# Patient Record
Sex: FEMALE | Race: WHITE | NOT HISPANIC OR LATINO | Employment: OTHER | URBAN - METROPOLITAN AREA
[De-identification: names, ages, dates, MRNs, and addresses within clinical notes are randomized per-mention and may not be internally consistent; named-entity substitution may affect disease eponyms.]

---

## 2017-08-08 ENCOUNTER — HOSPITAL ENCOUNTER (EMERGENCY)
Facility: MEDICAL CENTER | Age: 27
End: 2017-08-08
Attending: EMERGENCY MEDICINE
Payer: MEDICAID

## 2017-08-08 VITALS
HEIGHT: 64 IN | RESPIRATION RATE: 16 BRPM | TEMPERATURE: 98.4 F | SYSTOLIC BLOOD PRESSURE: 133 MMHG | DIASTOLIC BLOOD PRESSURE: 82 MMHG | BODY MASS INDEX: 35.49 KG/M2 | HEART RATE: 79 BPM | OXYGEN SATURATION: 98 % | WEIGHT: 207.89 LBS

## 2017-08-08 DIAGNOSIS — S61.319A NAILBED LACERATION, FINGER, INITIAL ENCOUNTER: ICD-10-CM

## 2017-08-08 DIAGNOSIS — S61.219A FINGER LACERATION, INITIAL ENCOUNTER: ICD-10-CM

## 2017-08-08 PROCEDURE — 90715 TDAP VACCINE 7 YRS/> IM: CPT | Performed by: EMERGENCY MEDICINE

## 2017-08-08 PROCEDURE — 303353 HCHG DERMABOND SKIN ADHESIVE

## 2017-08-08 PROCEDURE — 700111 HCHG RX REV CODE 636 W/ 250 OVERRIDE (IP): Performed by: EMERGENCY MEDICINE

## 2017-08-08 PROCEDURE — 99283 EMERGENCY DEPT VISIT LOW MDM: CPT

## 2017-08-08 PROCEDURE — 90471 IMMUNIZATION ADMIN: CPT

## 2017-08-08 PROCEDURE — 304999 HCHG REPAIR-SIMPLE/INTERMED LEVEL 1

## 2017-08-08 RX ADMIN — CLOSTRIDIUM TETANI TOXOID ANTIGEN (FORMALDEHYDE INACTIVATED), CORYNEBACTERIUM DIPHTHERIAE TOXOID ANTIGEN (FORMALDEHYDE INACTIVATED), BORDETELLA PERTUSSIS TOXOID ANTIGEN (GLUTARALDEHYDE INACTIVATED), BORDETELLA PERTUSSIS FILAMENTOUS HEMAGGLUTININ ANTIGEN (FORMALDEHYDE INACTIVATED), BORDETELLA PERTUSSIS PERTACTIN ANTIGEN, AND BORDETELLA PERTUSSIS FIMBRIAE 2/3 ANTIGEN 0.5 ML: 5; 2; 2.5; 5; 3; 5 INJECTION, SUSPENSION INTRAMUSCULAR at 14:07

## 2017-08-08 ASSESSMENT — PAIN SCALES - GENERAL: PAINLEVEL_OUTOF10: 2

## 2017-08-08 NOTE — ED NOTES
Pt bib family after laceration left thumb. Pt accidentally cut left thumb while making a salad. Unsure of last tetanus vaccination

## 2017-08-08 NOTE — ED AVS SNAPSHOT
Dealo Access Code: [email or web address]  Expires: 9/7/2017  2:14 PM    Your email address is not on file at [facility].  Email Addresses are required for you to sign up for Dealo, please contact [phone] to verify your personal information and to provide your email address prior to attempting to register for Dealo.    Sari Marion  [address] [place] [address]    Dealo  A secure, online tool to manage your health information     [facility]’s Dealo® is a secure, online tool that connects you to your personalized health information from the privacy of your home -- day or night - making it very easy for you to manage your healthcare. Once the activation process is completed, you can even access your medical information using the Dealo shea, which is available for free in the Apple Shea store or Google Play store.     To learn more about Dealo, visit [email or web address]    There are two levels of access available (as shown below):   My Chart Features  [facility] Primary Care Doctor [facility]  Specialists [facility]  Urgent  Care Non-[facility] Primary Care Doctor   Email your healthcare team securely and privately 24/7 X X X    Manage appointments: schedule your next appointment; view details of past/upcoming appointments X      Request prescription refills. X      View recent personal medical records, including lab and immunizations X X X X   View health record, including health history, allergies, medications X X X X   Read reports about your outpatient visits, procedures, consult and ER notes X X X X   See your discharge summary, which is a recap of your hospital and/or ER visit that includes your diagnosis, lab results, and care plan X X  X     How to register for The App3t:  Once your e-mail address has been verified, follow the following steps to sign up for Dealo.     1. Go to  [email or web address]  2. Click on the Sign Up Now box, which takes you to the New Member Sign Up page. You will need  to provide the following information:  a. Enter your [facility] Access Code exactly as it appears at the top of this page. (You will not need to use this code after you’ve completed the sign-up process. If you do not sign up before the expiration date, you must request a new code.)   b. Enter your date of birth.   c. Enter your home email address.   d. Click Submit, and follow the next screen’s instructions.  3. Create a [facility] ID. This will be your [facility] login ID and cannot be changed, so think of one that is secure and easy to remember.  4. Create a [facility] password. You can change your password at any time.  5. Enter your Password Reset Question and Answer. This can be used at a later time if you forget your password.   6. Enter your e-mail address. This allows you to receive e-mail notifications when new information is available in [facility].  7. Click Sign Up. You can now view your health information.    For assistance activating your [facility] account, call [phone]

## 2017-08-08 NOTE — ED AVS SNAPSHOT
8/8/2017    Sari Marion  [address] [place] [address]    Dear Sari:    [facility] wants to ensure your discharge home is safe and you or your loved ones have had all of your questions answered regarding your care after you leave the hospital.    Below is a list of resources and contact information should you have any questions regarding your hospital stay, follow-up instructions, or active medical symptoms.    Questions or Concerns Regarding… Contact   Medical Questions Related to Your Discharge  (7 days a week, 8am-5pm) Contact a Nurse Care Coordinator   [phone]   Medical Questions Not Related to Your Discharge  (24 hours a day / 7 days a week)  Contact the Nurse Health Line   [phone]    Medications or Discharge Instructions Refer to your discharge packet   or contact your [facility] Primary Care Provider   [phone]   Follow-up Appointment(s) Schedule your appointment via [employer]   or contact Scheduling [phone]   Billing Review your statement via [employer] Software  or contact Billing [phone]   Medical Records Review your records via [employer] Software   or contact Medical Records [phone]     You may receive a telephone call within two days of discharge. This call is to make certain you understand your discharge instructions and have the opportunity to have any questions answered. You can also easily access your medical information, test results and upcoming appointments via the [employer] Software free online health management tool. You can learn more and sign up at [employer]. For assistance setting up your [employer] Software account, please call [phone].    Once again, we want to ensure your discharge home is safe and that you have a clear understanding of any next steps in your care. If you have any questions or concerns, please do not hesitate to contact us, we are here for you. Thank you for choosing [facility] for your healthcare needs.    Sincerely,    Your [facility] Healthcare Team

## 2017-08-08 NOTE — ED PROVIDER NOTES
"ED Provider Note    CHIEF COMPLAINT  Laceration    HPI  Sari Marion is a 27 y.o. female who presents with complaint of a laceration on the left thumb which occurred just prior to arrival.The patient sustained this injury by  Cutting it with a knife while making a salad. Tetanus vaccination status reviewed and not up to date    REVIEW OF SYSTEMS  See HPI for further details. All other systems are negative.     PAST MEDICAL HISTORY   None    SOCIAL HISTORY  Social History     Social History Main Topics   • Smoking status: Never Smoker    • Smokeless tobacco: Not on file   • Alcohol Use: No   • Drug Use: No   • Sexual Activity: Not on file       SURGICAL HISTORY  patient denies any surgical history    CURRENT MEDICATIONS  Reviewed.  See Encounter Summary.  Include   Current facility-administered medications:   •  tetanus-dipth-acell pertussis (ADACEL) inj 0.5 mL, 0.5 mL, Intramuscular, Once PRN, Georgie Fofana M.D.    Current outpatient prescriptions:   •  trazodone (DESYREL) 150 MG TABS, Take 150 mg by mouth every evening., Disp: , Rfl:   •  promethazine-codeine (PHENERGAN-CODEINE) 6.25-10 MG/5ML SYRP, Take 5 mL by mouth 4 times a day as needed for Cough., Disp: 120 mL, Rfl: 0  •  azithromycin (ZITHROMAX) 250 MG TABS, Use ESVIN as directed, Disp: 6 Tab, Rfl: 0  •  fluoxetine (PROZAC) 20 MG CAPS, Take 1 Cap by mouth every day., Disp: 30 Cap, Rfl: 2  •  alprazolam (XANAX) 1 MG TABS, Take 1 mg by mouth at bedtime as needed., Disp: , Rfl:       ALLERGIES  Allergies   Allergen Reactions   • Nkda [No Known Drug Allergy]        PHYSICAL EXAM  VITAL SIGNS: /82 mmHg  Pulse 79  Temp(Src) 36.9 °C (98.4 °F)  Resp 16  Ht 1.626 m (5' 4\")  Wt 94.3 kg (207 lb 14.3 oz)  BMI 35.67 kg/m2  SpO2 98%  Constitutional: Alert in no apparent distress.  HENT: Normocephalic, Atraumatic, Bilateral external ears normal. Nose normal.   Eyes: Pupils are equal and reactive. Conjunctiva normal, non-icteric.   Thorax & Lungs: Easy " unlabored respirations  Abdomen:  No gross signs of peritonitis no pain with movement   Skin: laceration to the left thumb throough lateral nail on to medial side of distal nail  Extremities:  Neurovascular and tendon structures are intact.  Neurologic: Alert, Grossly non-focal.   Psychiatric: Affect and Mood normal      COURSE & MEDICAL DECISION MAKING  Pertinent Labs & Imaging studies reviewed. (See chart for details)    The underlying bone is unlikely broken because of the mechanism  The wound is not contaminated and the risk of infection is low    Laceration Repair Procedure Note    Indication: Laceration    Procedure: The patient was placed in the appropriate position and anesthesia around the laceration was not performed at the patient's request. The area was then cleansed with Shur-Clens and draped in a sterile fashionThe laceration was closed with Dermabond. There were no additional lacerations requiring repair. The wound area was then dressed with finger spint.      Total repaired wound length: 2 cm.     Other Items: None    The patient tolerated the procedure well.    Complications: None  s. The patient needs to return immediately if there is any redness pus or drainage or signs of infection otherwise they should follow the wound care information sheet     FINAL IMPRESSION  1. Finger and nail Laceration, 2 cm, status post suture repair     The patient was discharged home with an information sheet on laceration care and told to return immediately for any signs or symptoms listed, but specifically if any redness, drainage, pus or wound opening.  The follow-up plan is documented in EPIC and provided in writing to the patient.    Electronically signed by: Georgie Fofana, 8/8/2017 1:46 PM

## 2017-08-08 NOTE — DISCHARGE INSTRUCTIONS
Stitches, Staples, or Adhesive Wound Closure  Doctors use stitches (sutures), staples, and certain glue (skin adhesives) to hold your skin together while it heals (wound closure). You may need this treatment after you have surgery or if you cut your skin accidentally. These methods help your skin heal more quickly. They also make it less likely that you will have a scar.  WHAT ARE THE DIFFERENT KINDS OF WOUND CLOSURES?  There are many options for wound closure. The one that your doctor uses depends on how deep and large your wound is.  Adhesive Glue  To use this glue to close a wound, your doctor holds the edges of the wound together and paints the glue on the surface of your skin. You may need more than one layer of glue. Then the wound may be covered with a light bandage (dressing).  This type of skin closure may be used for small wounds that are not deep (superficial). Using glue for wound closure is less painful than other methods. It does not require a medicine that numbs the area. This method also leaves nothing to be removed. Adhesive glue is often used for children and on facial wounds.  Adhesive glue cannot be used for wounds that are deep, uneven, or bleeding. It is not used inside of a wound.   Adhesive Strips  These strips are made of sticky (adhesive), porous paper. They are placed across your skin edges like a regular adhesive bandage. You leave them on until they fall off.  Adhesive strips may be used to close very superficial wounds. They may also be used along with sutures to improve closure of your skin edges.   Sutures  Sutures are the oldest method of wound closure. Sutures can be made from natural or synthetic materials. They can be made from a material that your body can break down as your wound heals (absorbable), or they can be made from a material that needs to be removed from your skin (nonabsorbable). They come in many different strengths and sizes.  Your doctor attaches the sutures to a  steel needle on one end. Sutures can be passed through your skin, or through the tissues beneath your skin. Then they are tied and cut. Your skin edges may be closed in one continuous stitch or in separate stitches.  Sutures are strong and can be used for all kinds of wounds. Absorbable sutures may be used to close tissues under the skin. The disadvantage of sutures is that they may cause skin reactions that lead to infection. Nonabsorbable sutures need to be removed.  Staples  When surgical staples are used to close a wound, the edges of your skin on both sides of the wound are brought close together. A staple is placed across the wound, and an instrument secures the edges together. Staples are often used to close surgical cuts (incisions).  Staples are faster to use than sutures, and they cause less reaction from your skin. Staples need to be removed using a tool that bends the staples away from your skin.  HOW DO I CARE FOR MY WOUND CLOSURE?  · Take medicines only as told by your doctor.  · If you were prescribed an antibiotic medicine for your wound, finish it all even if you start to feel better.  · Use ointments or creams only as told by your doctor.  · Wash your hands with soap and water before and after touching your wound.  · Do not soak your wound in water. Do not take baths, swim, or use a hot tub until your doctor says it is okay.  · Ask your doctor when you can start showering. Cover your wound if told by your doctor.  · Do not take out your own sutures or staples.  · Do not pick at your wound. Picking can cause an infection.  · Keep all follow-up visits as told by your doctor. This is important.  HOW LONG WILL I HAVE MY WOUND CLOSURE?   · Leave adhesive glue on your skin until the glue peels away.  · Leave adhesive strips on your skin until they fall off.  · Absorbable sutures will dissolve within several days.  · Nonabsorbable sutures and staples must be removed. The location of the wound will  determine how long they stay in. This can range from several days to a couple of weeks.  WHEN SHOULD I SEEK HELP FOR MY WOUND CLOSURE?  Contact your doctor if:  · You have a fever.  · You have chills.  · You have redness, puffiness (swelling), or pain at the site of your wound.  · You have fluid, blood, or pus coming from your wound.  · There is a bad smell coming from your wound.  · The skin edges of your wound start to separate after your sutures have been removed.  · Your wound becomes thick, raised, and darker in color after your sutures come out (scarring).     This information is not intended to replace advice given to you by your health care provider. Make sure you discuss any questions you have with your health care provider.     Document Released: 10/15/2010 Document Revised: 01/08/2016 Document Reviewed: 05/27/2015  Elsevier Interactive Patient Education ©2016 [employer]

## 2017-08-08 NOTE — ED AVS SNAPSHOT
Home Care Instructions                                                                                                                Sari Marion   MRN: [id number]    Department:  [facility], Emergency Dept   Date of Visit:  8/8/2017            [facility], Emergency Dept    [address] [place] [address]    Phone:  [phone]      You were seen by     Georgie Fofana M.D.      Your Diagnosis Was     Finger laceration, initial encounter     S61.219A       These are the medications you received during your hospitalization from 08/08/2017 1319 to 08/08/2017 1414     Date/Time Order Dose Route Action    08/08/2017 1407 tetanus-dipth-acell pertussis (ADACEL) inj 0.5 mL 0.5 mL Intramuscular Given      Follow-up Information     1. Follow up with Pcp Pt States None.    Specialty:  Family Medicine    Why:  return if any signs of infection      Medication Information     Review all of your home medications and newly ordered medications with your primary doctor and/or pharmacist as soon as possible. Follow medication instructions as directed by your doctor and/or pharmacist.     Please keep your complete medication list with you and share with your physician. Update the information when medications are discontinued, doses are changed, or new medications (including over-the-counter products) are added; and carry medication information at all times in the event of emergency situations.               Medication List      ASK your doctor about these medications        Instructions    Morning Afternoon Evening Bedtime    alprazolam 1 MG Tabs   Commonly known as:  XANAX        Take 1 mg by mouth at bedtime as needed.   Dose:  1 mg                        azithromycin 250 MG Tabs   Commonly known as:  ZITHROMAX        Use ESVIN as directed                        fluoxetine 20 MG Caps   Commonly known as:  PROZAC        Take 1 Cap by mouth every day.   Dose:  20 mg                           promethazine-codeine 6.25-10 MG/5ML Syrp   Commonly known as:  PHENERGAN-CODEINE        Take 5 mL by mouth 4 times a day as needed for Cough.   Dose:  5 mL                        trazodone 150 MG Tabs   Commonly known as:  DESYREL        Take 150 mg by mouth every evening.   Dose:  150 mg                                  Discharge Instructions       Stitches, Staples, or Adhesive Wound Closure  Doctors use stitches (sutures), staples, and certain glue (skin adhesives) to hold your skin together while it heals (wound closure). You may need this treatment after you have surgery or if you cut your skin accidentally. These methods help your skin heal more quickly. They also make it less likely that you will have a scar.  WHAT ARE THE DIFFERENT KINDS OF WOUND CLOSURES?  There are many options for wound closure. The one that your doctor uses depends on how deep and large your wound is.  Adhesive Glue  To use this glue to close a wound, your doctor holds the edges of the wound together and paints the glue on the surface of your skin. You may need more than one layer of glue. Then the wound may be covered with a light bandage (dressing).  This type of skin closure may be used for small wounds that are not deep (superficial). Using glue for wound closure is less painful than other methods. It does not require a medicine that numbs the area. This method also leaves nothing to be removed. Adhesive glue is often used for children and on facial wounds.  Adhesive glue cannot be used for wounds that are deep, uneven, or bleeding. It is not used inside of a wound.   Adhesive Strips  These strips are made of sticky (adhesive), porous paper. They are placed across your skin edges like a regular adhesive bandage. You leave them on until they fall off.  Adhesive strips may be used to close very superficial wounds. They may also be used along with sutures to improve closure of your skin edges.   Sutures  Sutures are  the oldest method of wound closure. Sutures can be made from natural or synthetic materials. They can be made from a material that your body can break down as your wound heals (absorbable), or they can be made from a material that needs to be removed from your skin (nonabsorbable). They come in many different strengths and sizes.  Your doctor attaches the sutures to a steel needle on one end. Sutures can be passed through your skin, or through the tissues beneath your skin. Then they are tied and cut. Your skin edges may be closed in one continuous stitch or in separate stitches.  Sutures are strong and can be used for all kinds of wounds. Absorbable sutures may be used to close tissues under the skin. The disadvantage of sutures is that they may cause skin reactions that lead to infection. Nonabsorbable sutures need to be removed.  Staples  When surgical staples are used to close a wound, the edges of your skin on both sides of the wound are brought close together. A staple is placed across the wound, and an instrument secures the edges together. Staples are often used to close surgical cuts (incisions).  Staples are faster to use than sutures, and they cause less reaction from your skin. Staples need to be removed using a tool that bends the staples away from your skin.  HOW DO I CARE FOR MY WOUND CLOSURE?  · Take medicines only as told by your doctor.  · If you were prescribed an antibiotic medicine for your wound, finish it all even if you start to feel better.  · Use ointments or creams only as told by your doctor.  · Wash your hands with soap and water before and after touching your wound.  · Do not soak your wound in water. Do not take baths, swim, or use a hot tub until your doctor says it is okay.  · Ask your doctor when you can start showering. Cover your wound if told by your doctor.  · Do not take out your own sutures or staples.  · Do not pick at your wound. Picking can cause an infection.  · Keep all  follow-up visits as told by your doctor. This is important.  HOW LONG WILL I HAVE MY WOUND CLOSURE?   · Leave adhesive glue on your skin until the glue peels away.  · Leave adhesive strips on your skin until they fall off.  · Absorbable sutures will dissolve within several days.  · Nonabsorbable sutures and staples must be removed. The location of the wound will determine how long they stay in. This can range from several days to a couple of weeks.  WHEN SHOULD I SEEK HELP FOR MY WOUND CLOSURE?  Contact your doctor if:  · You have a fever.  · You have chills.  · You have redness, puffiness (swelling), or pain at the site of your wound.  · You have fluid, blood, or pus coming from your wound.  · There is a bad smell coming from your wound.  · The skin edges of your wound start to separate after your sutures have been removed.  · Your wound becomes thick, raised, and darker in color after your sutures come out (scarring).     This information is not intended to replace advice given to you by your health care provider. Make sure you discuss any questions you have with your health care provider.     Document Released: 10/15/2010 Document Revised: 01/08/2016 Document Reviewed: 05/27/2015  [employer] Interactive Patient Education ©2016 [employer]            Patient Information     Patient Information    Following emergency treatment: all patient requiring follow-up care must return either to a private physician or a clinic if your condition worsens before you are able to obtain further medical attention, please return to the emergency room.     Billing Information    At [facility], we work to make the billing process streamlined for our patients.  Our Representatives are here to answer any questions you may have regarding your hospital bill.  If you have insurance coverage and have supplied your insurance information to us, we will submit a claim to your insurer on your behalf.  Should you have any questions regarding  your bill, we can be reached online or by phone as follows:  Online: You are able pay your bills online or live chat with our representatives about any billing questions you may have. We are here to help Monday - Friday from 8:00am to 7:30pm and 9:00am - 12:00pm on Saturdays.  Please visit [email or web address]  for more information.   Phone:  [phone] or [phone]    Please note that your emergency physician, surgeon, pathologist, radiologist, anesthesiologist, and other specialists are not employed by [facility] and will therefore bill separately for their services.  Please contact them directly for any questions concerning their bills at the numbers below:     Emer[facility] Physician Services:  [phone]  [facility]:  [phone]  [facility]:  [phone]  [facility]:  [phone]    1. Your final bill may vary from the amount quoted upon discharge if all procedures are not complete at that time, or if your doctor has additional procedures of which we are not aware. You will receive an additional bill if you return to the Emergency Department at [facility] for suture removal regardless of the facility of which the sutures were placed.     2. Please arrange for settlement of this account at the emergency registration.    3. All self-pay accounts are due in full at the time of treatment.  If you are unable to meet this obligation then payment is expected within 4-5 days.     4. If you have had radiology studies (CT, X-ray, Ultrasound, MRI), you have received a preliminary result during your emergency department visit. Please contact the radiology department [phone] to receive a copy of your final result. Please discuss the Final result with your primary physician or with the follow up physician provided.     Crisis Hotline:  National Crisis Hotline:  8-565-ETMEPXO or 1-902.195.7708  [place] Crisis Hotline:     4-961-477-6770 or 762-537-0254         ED Discharge Follow Up Questions    1. In order to provide you with very good care, we would like to follow up with a phone call in the next few days.  May we have your permission to contact you?     YES /  NO    2. What is the best phone number to call you? (       )_____-__________    3. What is the best time to call you?      Morning  /  Afternoon  /  Evening                   Patient Signature:  ____________________________________________________________    Date:  ____________________________________________________________

## 2019-03-28 ENCOUNTER — OFFICE VISIT (OUTPATIENT)
Dept: MEDICAL GROUP | Facility: LAB | Age: 29
End: 2019-03-28
Payer: COMMERCIAL

## 2019-03-28 VITALS
TEMPERATURE: 98 F | RESPIRATION RATE: 20 BRPM | OXYGEN SATURATION: 96 % | HEIGHT: 64 IN | WEIGHT: 211 LBS | BODY MASS INDEX: 36.02 KG/M2 | HEART RATE: 77 BPM | SYSTOLIC BLOOD PRESSURE: 118 MMHG | DIASTOLIC BLOOD PRESSURE: 80 MMHG

## 2019-03-28 DIAGNOSIS — R53.83 FATIGUE, UNSPECIFIED TYPE: ICD-10-CM

## 2019-03-28 DIAGNOSIS — G43.109 MIGRAINE WITH AURA AND WITHOUT STATUS MIGRAINOSUS, NOT INTRACTABLE: ICD-10-CM

## 2019-03-28 DIAGNOSIS — Z11.3 ROUTINE SCREENING FOR STI (SEXUALLY TRANSMITTED INFECTION): ICD-10-CM

## 2019-03-28 DIAGNOSIS — F90.0 ATTENTION DEFICIT HYPERACTIVITY DISORDER (ADHD), PREDOMINANTLY INATTENTIVE TYPE: ICD-10-CM

## 2019-03-28 DIAGNOSIS — E66.9 OBESITY (BMI 35.0-39.9 WITHOUT COMORBIDITY): ICD-10-CM

## 2019-03-28 DIAGNOSIS — Z20.828 EXPOSURE TO HERPES SIMPLEX VIRUS (HSV): ICD-10-CM

## 2019-03-28 DIAGNOSIS — F43.10 PTSD (POST-TRAUMATIC STRESS DISORDER): ICD-10-CM

## 2019-03-28 PROBLEM — F90.9 ADHD: Status: ACTIVE | Noted: 2019-03-28

## 2019-03-28 PROCEDURE — 99203 OFFICE O/P NEW LOW 30 MIN: CPT | Performed by: NURSE PRACTITIONER

## 2019-03-28 RX ORDER — ESCITALOPRAM OXALATE 20 MG/1
20 TABLET ORAL
Refills: 2 | COMMUNITY
Start: 2019-03-13 | End: 2021-04-01

## 2019-03-28 RX ORDER — METHYLPHENIDATE HYDROCHLORIDE 20 MG/1
20 TABLET ORAL 2 TIMES DAILY
COMMUNITY
End: 2021-04-01

## 2019-03-28 RX ORDER — ALPRAZOLAM 0.5 MG/1
TABLET ORAL
Refills: 2 | COMMUNITY
Start: 2019-02-11 | End: 2021-04-01

## 2019-03-28 ASSESSMENT — PATIENT HEALTH QUESTIONNAIRE - PHQ9: CLINICAL INTERPRETATION OF PHQ2 SCORE: 0

## 2019-03-28 NOTE — ASSESSMENT & PLAN NOTE
This is a chronic problem for this patient.  She has a therapist who follows her for this.  She is currently controlled with Ritalin 20 mg twice daily.

## 2019-03-28 NOTE — ASSESSMENT & PLAN NOTE
This is a chronic problem for this patient. They began in puberty. They are triggered by poor dietary choices and lack of sleep. They occur 2-3 times monthly. She reports they are sometimes associated with purple dots in her vision. Patient reports frequent headaches located in the frontal region. Describes headaches as moderate in severity  with nausea, phonophobia and photophobia.    Headaches are relieved by acetaminophen, ibuprofen  Preventive treatment currently: None.  She tried propanolol in the past and did not get relief.    Known triggers include: perimenstrual patterns, unhealthy foods, alcohol intake and stress weather changes, poor sleep.   Current stressors include:   work, finance, family. Usually sleeps 5-7 hours per night.     Denies red flag symptoms, including: headache in same location, persistent headache, headache worse with bending over or sneezing, headache waking up during sleep. Also denies difficulty with speech, focal weakness, fever, cough, sinus congestion or teeth grinding.    Family Hx: migraine headaches in father  Prior imaging: yes.  She reports that she has had a head CT in the past with no abnormal findings.

## 2019-03-28 NOTE — ASSESSMENT & PLAN NOTE
Is a chronic problem for this patient.  She admits her diet has not been optimal. Patient and I discussed the importance of lifestyle changes, with particular emphasis on decreasing sugar and carbohydrate intake and increasing plant-based nutrition (for the purposes of weight loss, general health, and prevention of chronic illnesses), as well as regular cardiovascular exercise, proper sleep, and stress management. Patient verbalized understanding.
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
## 2019-04-30 ENCOUNTER — HOSPITAL ENCOUNTER (OUTPATIENT)
Dept: LAB | Facility: MEDICAL CENTER | Age: 29
End: 2019-04-30
Attending: NURSE PRACTITIONER
Payer: COMMERCIAL

## 2019-04-30 ENCOUNTER — HOSPITAL ENCOUNTER (OUTPATIENT)
Dept: LAB | Facility: MEDICAL CENTER | Age: 29
End: 2019-04-30
Attending: PSYCHIATRY & NEUROLOGY
Payer: COMMERCIAL

## 2019-04-30 DIAGNOSIS — E66.9 OBESITY (BMI 35.0-39.9 WITHOUT COMORBIDITY): ICD-10-CM

## 2019-04-30 DIAGNOSIS — Z11.3 ROUTINE SCREENING FOR STI (SEXUALLY TRANSMITTED INFECTION): ICD-10-CM

## 2019-04-30 LAB
ALBUMIN SERPL BCP-MCNC: 4.2 G/DL (ref 3.2–4.9)
ALBUMIN/GLOB SERPL: 1.7 G/DL
ALP SERPL-CCNC: 48 U/L (ref 30–99)
ALT SERPL-CCNC: 22 U/L (ref 2–50)
ANION GAP SERPL CALC-SCNC: 9 MMOL/L (ref 0–11.9)
AST SERPL-CCNC: 15 U/L (ref 12–45)
BASOPHILS # BLD AUTO: 0.7 % (ref 0–1.8)
BASOPHILS # BLD AUTO: 0.8 % (ref 0–1.8)
BASOPHILS # BLD: 0.05 K/UL (ref 0–0.12)
BASOPHILS # BLD: 0.06 K/UL (ref 0–0.12)
BILIRUB SERPL-MCNC: 0.3 MG/DL (ref 0.1–1.5)
BUN SERPL-MCNC: 16 MG/DL (ref 8–22)
C TRACH DNA SPEC QL NAA+PROBE: NEGATIVE
CALCIUM SERPL-MCNC: 9.5 MG/DL (ref 8.5–10.5)
CHLORIDE SERPL-SCNC: 105 MMOL/L (ref 96–112)
CHOLEST SERPL-MCNC: 238 MG/DL (ref 100–199)
CO2 SERPL-SCNC: 26 MMOL/L (ref 20–33)
CREAT SERPL-MCNC: 0.63 MG/DL (ref 0.5–1.4)
EOSINOPHIL # BLD AUTO: 0.08 K/UL (ref 0–0.51)
EOSINOPHIL # BLD AUTO: 0.11 K/UL (ref 0–0.51)
EOSINOPHIL NFR BLD: 1.1 % (ref 0–6.9)
EOSINOPHIL NFR BLD: 1.5 % (ref 0–6.9)
ERYTHROCYTE [DISTWIDTH] IN BLOOD BY AUTOMATED COUNT: 43 FL (ref 35.9–50)
ERYTHROCYTE [DISTWIDTH] IN BLOOD BY AUTOMATED COUNT: 43.4 FL (ref 35.9–50)
FASTING STATUS PATIENT QL REPORTED: NORMAL
GLOBULIN SER CALC-MCNC: 2.5 G/DL (ref 1.9–3.5)
GLUCOSE SERPL-MCNC: 84 MG/DL (ref 65–99)
HCT VFR BLD AUTO: 44.4 % (ref 37–47)
HCT VFR BLD AUTO: 45.4 % (ref 37–47)
HDLC SERPL-MCNC: 50 MG/DL
HGB BLD-MCNC: 13.8 G/DL (ref 12–16)
HGB BLD-MCNC: 14 G/DL (ref 12–16)
IMM GRANULOCYTES # BLD AUTO: 0.02 K/UL (ref 0–0.11)
IMM GRANULOCYTES # BLD AUTO: 0.03 K/UL (ref 0–0.11)
IMM GRANULOCYTES NFR BLD AUTO: 0.3 % (ref 0–0.9)
IMM GRANULOCYTES NFR BLD AUTO: 0.4 % (ref 0–0.9)
LDLC SERPL CALC-MCNC: 170 MG/DL
LYMPHOCYTES # BLD AUTO: 2.6 K/UL (ref 1–4.8)
LYMPHOCYTES # BLD AUTO: 2.63 K/UL (ref 1–4.8)
LYMPHOCYTES NFR BLD: 35 % (ref 22–41)
LYMPHOCYTES NFR BLD: 36.8 % (ref 22–41)
MCH RBC QN AUTO: 27.6 PG (ref 27–33)
MCH RBC QN AUTO: 27.7 PG (ref 27–33)
MCHC RBC AUTO-ENTMCNC: 30.8 G/DL (ref 33.6–35)
MCHC RBC AUTO-ENTMCNC: 31.1 G/DL (ref 33.6–35)
MCV RBC AUTO: 89 FL (ref 81.4–97.8)
MCV RBC AUTO: 89.4 FL (ref 81.4–97.8)
MONOCYTES # BLD AUTO: 0.36 K/UL (ref 0–0.85)
MONOCYTES # BLD AUTO: 0.41 K/UL (ref 0–0.85)
MONOCYTES NFR BLD AUTO: 5 % (ref 0–13.4)
MONOCYTES NFR BLD AUTO: 5.5 % (ref 0–13.4)
N GONORRHOEA DNA SPEC QL NAA+PROBE: NEGATIVE
NEUTROPHILS # BLD AUTO: 3.99 K/UL (ref 2–7.15)
NEUTROPHILS # BLD AUTO: 4.23 K/UL (ref 2–7.15)
NEUTROPHILS NFR BLD: 55.9 % (ref 44–72)
NEUTROPHILS NFR BLD: 57 % (ref 44–72)
NRBC # BLD AUTO: 0 K/UL
NRBC # BLD AUTO: 0 K/UL
NRBC BLD-RTO: 0 /100 WBC
NRBC BLD-RTO: 0 /100 WBC
PLATELET # BLD AUTO: 305 K/UL (ref 164–446)
PLATELET # BLD AUTO: 308 K/UL (ref 164–446)
PMV BLD AUTO: 10.3 FL (ref 9–12.9)
PMV BLD AUTO: 10.3 FL (ref 9–12.9)
POTASSIUM SERPL-SCNC: 4.2 MMOL/L (ref 3.6–5.5)
PROT SERPL-MCNC: 6.7 G/DL (ref 6–8.2)
RBC # BLD AUTO: 4.99 M/UL (ref 4.2–5.4)
RBC # BLD AUTO: 5.08 M/UL (ref 4.2–5.4)
SODIUM SERPL-SCNC: 140 MMOL/L (ref 135–145)
SPECIMEN SOURCE: NORMAL
T4 FREE SERPL-MCNC: 1.03 NG/DL (ref 0.53–1.43)
TRIGL SERPL-MCNC: 90 MG/DL (ref 0–149)
TSH SERPL DL<=0.005 MIU/L-ACNC: 2.64 UIU/ML (ref 0.38–5.33)
WBC # BLD AUTO: 7.2 K/UL (ref 4.8–10.8)
WBC # BLD AUTO: 7.4 K/UL (ref 4.8–10.8)

## 2019-04-30 PROCEDURE — 80061 LIPID PANEL: CPT

## 2019-04-30 PROCEDURE — 85025 COMPLETE CBC W/AUTO DIFF WBC: CPT

## 2019-04-30 PROCEDURE — 84443 ASSAY THYROID STIM HORMONE: CPT

## 2019-04-30 PROCEDURE — 85025 COMPLETE CBC W/AUTO DIFF WBC: CPT | Mod: 91

## 2019-04-30 PROCEDURE — 80307 DRUG TEST PRSMV CHEM ANLYZR: CPT

## 2019-04-30 PROCEDURE — 36415 COLL VENOUS BLD VENIPUNCTURE: CPT

## 2019-04-30 PROCEDURE — 84439 ASSAY OF FREE THYROXINE: CPT

## 2019-04-30 PROCEDURE — 84443 ASSAY THYROID STIM HORMONE: CPT | Mod: 91

## 2019-04-30 PROCEDURE — 87389 HIV-1 AG W/HIV-1&-2 AB AG IA: CPT

## 2019-04-30 PROCEDURE — 86694 HERPES SIMPLEX NES ANTBDY: CPT | Mod: 91

## 2019-04-30 PROCEDURE — 80053 COMPREHEN METABOLIC PANEL: CPT

## 2019-04-30 PROCEDURE — 87491 CHLMYD TRACH DNA AMP PROBE: CPT

## 2019-04-30 PROCEDURE — 86780 TREPONEMA PALLIDUM: CPT

## 2019-04-30 PROCEDURE — 81291 MTHFR GENE: CPT

## 2019-04-30 PROCEDURE — 87591 N.GONORRHOEAE DNA AMP PROB: CPT

## 2019-05-01 LAB
AMPHET CTO UR CFM-MCNC: NEGATIVE NG/ML
BARBITURATES CTO UR CFM-MCNC: NEGATIVE NG/ML
BENZODIAZ CTO UR CFM-MCNC: NEGATIVE NG/ML
CANNABINOIDS CTO UR CFM-MCNC: NEGATIVE NG/ML
COCAINE CTO UR CFM-MCNC: NEGATIVE NG/ML
DRUG COMMENT 753798: NORMAL
HIV 1+2 AB+HIV1 P24 AG SERPL QL IA: NON REACTIVE
METHADONE CTO UR CFM-MCNC: NEGATIVE NG/ML
OPIATES CTO UR CFM-MCNC: NEGATIVE NG/ML
PCP CTO UR CFM-MCNC: NEGATIVE NG/ML
PROPOXYPH CTO UR CFM-MCNC: NEGATIVE NG/ML
TREPONEMA PALLIDUM IGG+IGM AB [PRESENCE] IN SERUM OR PLASMA BY IMMUNOASSAY: NON REACTIVE
TSH SERPL DL<=0.005 MIU/L-ACNC: 2.67 UIU/ML (ref 0.38–5.33)

## 2019-05-02 LAB
HSV1+2 IGG SER IA-ACNC: 1.06 IV
HSV1+2 IGM SER IA-ACNC: 0.3 IV

## 2019-05-06 LAB
MTHFR C.1298A>C GENO BLD/T: NEGATIVE
MTHFR C.677C>T GENO BLD/T: NEGATIVE
MTHFR GENE MUT ANL BLD/T: NORMAL

## 2019-05-30 ENCOUNTER — TELEPHONE (OUTPATIENT)
Dept: MEDICAL GROUP | Facility: LAB | Age: 29
End: 2019-05-30

## 2019-05-30 NOTE — TELEPHONE ENCOUNTER
VOICEMAIL   Wednesday 5/29/19  1. Caller Name: Sari                Call Back Number: [phone] (home)     2. Message: Sari called she said she sign a release of information with her psychiatrist. She is needing her UA results sent to her psychiatrist before she can get her prescription.     I see the release of information is scanned into her chart would you like me to send the results.    3. Patient approves office to leave a detailed voicemail/MyChart message: yes

## 2019-05-30 NOTE — TELEPHONE ENCOUNTER
Phone Number Called: [phone] (home)     Call outcome: spoke to patient regarding message below    Message: I spoke to Sari letting know I am waiting on HIM Department to contact me regarding the release of her lab results.    I spoke to Andrew in HIM and she will send the [facility] records.

## 2019-05-31 ENCOUNTER — HOSPITAL ENCOUNTER (OUTPATIENT)
Facility: MEDICAL CENTER | Age: 29
End: 2019-05-31
Attending: NURSE PRACTITIONER
Payer: COMMERCIAL

## 2019-05-31 ENCOUNTER — OFFICE VISIT (OUTPATIENT)
Dept: MEDICAL GROUP | Facility: LAB | Age: 29
End: 2019-05-31
Payer: COMMERCIAL

## 2019-05-31 VITALS
TEMPERATURE: 98.4 F | RESPIRATION RATE: 14 BRPM | SYSTOLIC BLOOD PRESSURE: 122 MMHG | OXYGEN SATURATION: 94 % | HEIGHT: 64 IN | DIASTOLIC BLOOD PRESSURE: 82 MMHG | HEART RATE: 76 BPM | BODY MASS INDEX: 35.96 KG/M2 | WEIGHT: 210.6 LBS

## 2019-05-31 DIAGNOSIS — Z01.419 WELL WOMAN EXAM WITH ROUTINE GYNECOLOGICAL EXAM: ICD-10-CM

## 2019-05-31 DIAGNOSIS — E78.5 DYSLIPIDEMIA: ICD-10-CM

## 2019-05-31 DIAGNOSIS — Z12.4 SCREENING FOR CERVICAL CANCER: ICD-10-CM

## 2019-05-31 PROCEDURE — 99395 PREV VISIT EST AGE 18-39: CPT | Performed by: NURSE PRACTITIONER

## 2019-05-31 PROCEDURE — 88175 CYTOPATH C/V AUTO FLUID REDO: CPT

## 2019-05-31 RX ORDER — FLUOXETINE 10 MG/1
20 CAPSULE ORAL DAILY
COMMUNITY
End: 2021-07-01

## 2019-05-31 NOTE — PROGRESS NOTES
SUBJECTIVE: Sari Marion is a 29 y.o. No obstetric history on file. female who is her today for annual routine gynecologic exam    Obstetric History       T0      L0     SAB0   TAB0   Ectopic0   Molar0   Multiple0   Live Births0       Last Pap:   History   Sexual Activity   • Sexual activity: Yes   • Birth control/ protection: Condom     Sexual history: currently sexually active   H/O Abnormal Pap no  She  reports that she has been smoking Cigarettes.  She has never used smokeless tobacco.        Allergies: Nkda [no known drug allergy]     ROS:      PREMENOPAUSAL  Menses every month with 5 days moderate bleeding   Cramping is moderate.       No significant bloating/fluid retention, pelvic pain, or dyspareunia. No vaginal discharge   No breast tenderness, mass, nipple discharge, changes in size or contour, or abnormal cyclic discomfort.  No urinary tract symptoms, no incontinence, no polydipsia, polyuria,  No abdominal pain, change in bowel habits, black or bloody stools.    No unusual headaches, no visual changes, menstrual migraines   No prolonged cough. No dyspnea or chest pain on exertion.  No depression, labile mood, anxiety, libido changes, insomnia.  No temperature intolerance.  No new/concerning skin lesions, concerns.     Exercise: moderate regular exercise program    Preventive Care:  Mammogram: Due at 40  Colonoscopy: Due at 50  Tetanus booster: Last done 2017      Current medicines (including changes today)  Current Outpatient Prescriptions   Medication Sig Dispense Refill   • FLUoxetine (PROZAC) 10 MG Cap Take 10 mg by mouth every day.     • methylphenidate (RITALIN) 20 MG tablet Take 20 mg by mouth 2 times a day.     • ALPRAZolam (XANAX) 0.5 MG Tab TAKE 1 TAB BY MOUTH TWICE DAILY AND 1 ADDITIONAL TAB DAILY AS NEEDED F43.10  2   • escitalopram (LEXAPRO) 20 MG tablet Take 20 mg by mouth.  2   • trazodone (DESYREL) 150 MG TABS Take 150 mg by mouth every evening.       No current  "facility-administered medications for this visit.      She  has a past medical history of Allergy; Anxiety; Asthma; Depression; and Migraine. She also has no past medical history of ASTHMA or Diabetes.  She  has no past surgical history on file.     Family History:   Family History   Problem Relation Age of Onset   • Arthritis Mother    • Psychiatry Mother    • Hyperlipidemia Mother    • Alcohol/Drug Father    • Psychiatry Father    • Hyperlipidemia Paternal Grandfather    • Hypertension Paternal Grandfather    • Psychiatry Paternal Grandfather        OBJECTIVE:   /82 (BP Location: Right arm, Patient Position: Sitting, BP Cuff Size: Adult long)   Pulse 76   Temp 36.9 °C (98.4 °F) (Temporal)   Resp 14   Ht 1.626 m (5' 4\")   Wt 95.5 kg (210 lb 9.6 oz)   LMP 05/21/2019 (Approximate)   SpO2 94%   Breastfeeding? No   BMI 36.15 kg/m²   Body mass index is 36.15 kg/m².    HEENT: NC/AT, MMM, oropharynx clear  NECK:  No cervical or supraclavicular EMMANUEL  NEURO: Cranial nerves II-XII grossly intact  CARDIOVASCULAR:  Regular rate and rhythm.  S1 and S2 normal.  No edema  LUNGS: CTAB  ABDOMEN:  Soft without tenderness, guarding, mass or organomegaly.  No CVA tenderness or inguinal adenopathy.   EXTREMITIES:  peripheral pulses are 2+.   SKIN: color normal, vascularity normal, temperature normal. No rashes or suspicious skin lesions noted.  BREAST: Performed with instruction during examination. No axillary lymphadenopathy, no skin changes, no dominant masses. No nipple retraction  Pelvic Exam -  Normal external genitalia with no lesions. Normal vaginal mucosa with normal rugation and scant discharge. Cervix with no visible lesions. No cervical motion tenderness. Uterus is normal sized with no masses. No adnexal tenderness or enlargement appreciated. Thin Prep Pap is obtained, vaginal swab is not obtained and specimen(s) sent to lab    ASSESSMENT and PLAN  1. Well woman exam with routine gynecological exam  Healthy " well woman exam.  No complaints.  - THINPREP PAP,REFLEX HPV ON ASC-US ONLY; Future    2. Screening for cervical cancer  - THINPREP PAP,REFLEX HPV ON ASC-US ONLY; Future    3. Dyslipidemia  Patient will repeat lipid profile in November.  She is working on lifestyle changes including increased exercise.  She bought a fit bit and is tracking her steps as well as her food.  She is working hard on losing weight.  - Lipid Profile; Future    Pap and physical exam performed  STI screening declined  Monthly SBE  Counseling: breast self exam, STD prevention, HIV risk factors and prevention, family planning choices and adequate intake of calcium and vitamin D  Encourage exercise and proper diet.  Mammograms starting @ age 40 annually.         Discussed  breast self exam, STD prevention, diet and exercise   Follow-up in 3 years for next Pap if results are normal.   Next office visit for recheck of chronic medical conditions is due in 6 months      Please note that this dictation was created using voice recognition software. I have made every reasonable attempt to correct obvious errors, but I expect that there are errors of grammar and possibly content that I did not discover before finalizing the note.

## 2019-06-01 LAB — CYTOLOGY REG CYTOL: NORMAL

## 2019-06-19 ENCOUNTER — OFFICE VISIT (OUTPATIENT)
Dept: MEDICAL GROUP | Facility: LAB | Age: 29
End: 2019-06-19
Payer: COMMERCIAL

## 2019-06-19 VITALS
TEMPERATURE: 98.4 F | HEIGHT: 64 IN | DIASTOLIC BLOOD PRESSURE: 62 MMHG | SYSTOLIC BLOOD PRESSURE: 100 MMHG | OXYGEN SATURATION: 97 % | HEART RATE: 65 BPM | BODY MASS INDEX: 35.51 KG/M2 | RESPIRATION RATE: 12 BRPM | WEIGHT: 208 LBS

## 2019-06-19 DIAGNOSIS — J06.9 UPPER RESPIRATORY TRACT INFECTION, UNSPECIFIED TYPE: ICD-10-CM

## 2019-06-19 PROCEDURE — 99213 OFFICE O/P EST LOW 20 MIN: CPT | Performed by: INTERNAL MEDICINE

## 2019-06-19 RX ORDER — AZITHROMYCIN 250 MG/1
TABLET, FILM COATED ORAL
Qty: 6 TAB | Refills: 0 | Status: SHIPPED | OUTPATIENT
Start: 2019-06-19 | End: 2019-11-27

## 2019-06-21 ENCOUNTER — TELEPHONE (OUTPATIENT)
Dept: MEDICAL GROUP | Facility: LAB | Age: 29
End: 2019-06-21

## 2019-06-21 NOTE — TELEPHONE ENCOUNTER
I spoke to Sari, she said that she thinks that the blood came from a tonsil that is really irritated from her cough.  It is not bleeding today and she is feeling a lot better.

## 2019-06-21 NOTE — TELEPHONE ENCOUNTER
Mirta:  Very important, any coughing or vomiting of blood needs to be evaluated in emergency room setting.  Ambulance if necessary or police for a welfare check if necessary.  Regards, Davie Smith, DO

## 2019-06-21 NOTE — TELEPHONE ENCOUNTER
1. Caller Name: Sari                                          Call Back Number: [phone] (home)        Patient approves a detailed voicemail message: yes  Pt called stating that she is now coughing up blood.  I called her back to get her an appt with our nurse.  I went straight to voicemail so I left a message for call back

## 2019-06-22 ENCOUNTER — PATIENT MESSAGE (OUTPATIENT)
Dept: MEDICAL GROUP | Facility: LAB | Age: 29
End: 2019-06-22

## 2019-06-24 ENCOUNTER — PATIENT MESSAGE (OUTPATIENT)
Dept: MEDICAL GROUP | Facility: LAB | Age: 29
End: 2019-06-24

## 2019-06-26 NOTE — TELEPHONE ENCOUNTER
From: Sari Marion  To: Davie Smith D.O.  Sent: 6/24/2019 5:32 PM PDT  Subject: Prescription Question    Hello-- the dramamine made me very groggy and confused and didn't help with the nausea. I've decided to just not take any more syrup or OTC meds either.   ----- Message -----  From: Davie Smith D.O.  Sent: 6/24/2019 5:27 PM PDT  To: Sari Marion  Subject: RE: Prescription Question  Sari:  Okay to use Dramamine or Antivert, both are sold over-the-counter, you may want to hold off using any additional cough syrup. Typically patients will get away with a medication the first time may have allergic reactions a second time.  Regards, Davie Smith, DO      ----- Message -----   From: Sari Marion   Sent: 6/22/2019 11:49 AM PDT   To: Davie Smith D.O.  Subject: Prescription Question    Hi there,     I came in last week and saw Dr. Smith about a cough/upper respiratory issue that was causing me to cough. I was prescribed a hydrocodone-homatropine cough syrup and it worked fine for the first night. However I took it yesterday and was extremely sick--vomiting and nausea. I am still nauseous today although I've stopped taking the medication. Can I take OTC dramamine to help with the nausea?

## 2019-07-19 ENCOUNTER — HOSPITAL ENCOUNTER (OUTPATIENT)
Dept: LAB | Facility: MEDICAL CENTER | Age: 29
End: 2019-07-19
Attending: PSYCHIATRY & NEUROLOGY
Payer: COMMERCIAL

## 2019-07-19 LAB
25(OH)D3 SERPL-MCNC: 32 NG/ML (ref 30–100)
VIT B12 SERPL-MCNC: 680 PG/ML (ref 211–911)

## 2019-07-19 PROCEDURE — 36415 COLL VENOUS BLD VENIPUNCTURE: CPT

## 2019-07-19 PROCEDURE — 82306 VITAMIN D 25 HYDROXY: CPT

## 2019-07-19 PROCEDURE — 82607 VITAMIN B-12: CPT

## 2019-11-27 ENCOUNTER — OFFICE VISIT (OUTPATIENT)
Dept: URGENT CARE | Facility: MEDICAL CENTER | Age: 29
End: 2019-11-27
Payer: COMMERCIAL

## 2019-11-27 VITALS
DIASTOLIC BLOOD PRESSURE: 74 MMHG | BODY MASS INDEX: 36.7 KG/M2 | TEMPERATURE: 98.6 F | OXYGEN SATURATION: 97 % | HEART RATE: 102 BPM | SYSTOLIC BLOOD PRESSURE: 122 MMHG | RESPIRATION RATE: 17 BRPM | HEIGHT: 64 IN | WEIGHT: 215 LBS

## 2019-11-27 DIAGNOSIS — R68.89 FLU-LIKE SYMPTOMS: Primary | ICD-10-CM

## 2019-11-27 DIAGNOSIS — R11.0 NAUSEA: ICD-10-CM

## 2019-11-27 LAB
FLUAV+FLUBV AG SPEC QL IA: NORMAL
INT CON NEG: NORMAL
INT CON POS: NORMAL

## 2019-11-27 PROCEDURE — 87804 INFLUENZA ASSAY W/OPTIC: CPT | Performed by: PHYSICIAN ASSISTANT

## 2019-11-27 PROCEDURE — 99214 OFFICE O/P EST MOD 30 MIN: CPT | Performed by: PHYSICIAN ASSISTANT

## 2019-11-27 RX ORDER — ONDANSETRON 4 MG/1
4 TABLET, ORALLY DISINTEGRATING ORAL EVERY 8 HOURS PRN
Qty: 10 TAB | Refills: 0 | Status: SHIPPED | OUTPATIENT
Start: 2019-11-27 | End: 2019-11-30

## 2019-11-27 RX ORDER — OSELTAMIVIR PHOSPHATE 75 MG/1
75 CAPSULE ORAL 2 TIMES DAILY
Qty: 10 CAP | Refills: 0 | Status: SHIPPED | OUTPATIENT
Start: 2019-11-27 | End: 2019-12-02

## 2019-11-27 RX ORDER — DEXTROMETHORPHAN HYDROBROMIDE AND PROMETHAZINE HYDROCHLORIDE 15; 6.25 MG/5ML; MG/5ML
5 SYRUP ORAL EVERY 4 HOURS PRN
Qty: 120 ML | Refills: 0 | Status: SHIPPED | OUTPATIENT
Start: 2019-11-27 | End: 2021-04-01

## 2019-11-27 NOTE — PROGRESS NOTES
Subjective:      Pt is a 29 y.o. female who presents with Influenza (x4days, cough, nausea, vomiting, fever chills, headache, sinus pressure)            HPI  This is a new problem. PT presents to  clinic today complaining of sore throat, fevers, chills, body aches, watery eyes, pressure in ears, cough, fatigue, runny nose. PT denies CP, SOB, NVD, abdominal pain, joint pain. PT states these symptoms began around 3 days ago. PT states the pain is a 7/10, aching in nature and worse at night.  Pt has not taken any RX medications for this condition. The pt's medication list, problem list, and allergies have been evaluated and reviewed during today's visit.      PMH:  Past Medical History:   Diagnosis Date   • Allergy    • Anxiety    • Asthma    • Depression    • Migraine        PSH:  Negative per pt.      Fam Hx:    family history includes Alcohol/Drug in her father; Arthritis in her mother; Hyperlipidemia in her mother and paternal grandfather; Hypertension in her paternal grandfather; Psychiatric Illness in her father, mother, and paternal grandfather.  Family Status   Relation Name Status   • Mo  Alive   • Fa  Alive   • Bro  Alive   • PGFa         Soc HX:  Social History     Socioeconomic History   • Marital status: Single     Spouse name: Not on file   • Number of children: Not on file   • Years of education: Not on file   • Highest education level: Not on file   Occupational History   • Not on file   Social Needs   • Financial resource strain: Not on file   • Food insecurity:     Worry: Not on file     Inability: Not on file   • Transportation needs:     Medical: Not on file     Non-medical: Not on file   Tobacco Use   • Smoking status: Former Smoker     Packs/day: 0.00     Types: Cigarettes     Last attempt to quit: 2009     Years since quitting: 10.5   • Smokeless tobacco: Never Used   Substance and Sexual Activity   • Alcohol use: No   • Drug use: No   • Sexual activity: Yes     Birth  control/protection: Condom   Lifestyle   • Physical activity:     Days per week: Not on file     Minutes per session: Not on file   • Stress: Not on file   Relationships   • Social connections:     Talks on phone: Not on file     Gets together: Not on file     Attends [employer] service: Not on file     Active member of club or organization: Not on file     Attends meetings of clubs or organizations: Not on file     Relationship status: Not on file   • Intimate partner violence:     Fear of current or ex partner: Not on file     Emotionally abused: Not on file     Physically abused: Not on file     Forced sexual activity: Not on file   Other Topics Concern   • Not on file   Social History Narrative   • Not on file         Medications:    Current Outpatient Medications:   •  oseltamivir (TAMIFLU) 75 MG Cap, Take 1 Cap by mouth 2 times a day for 5 days., Disp: 10 Cap, Rfl: 0  •  ondansetron (ZOFRAN ODT) 4 MG TABLET DISPERSIBLE, Take 1 Tab by mouth every 8 hours as needed for up to 3 days., Disp: 10 Tab, Rfl: 0  •  FLUoxetine (PROZAC) 10 MG Cap, Take 40 mg by mouth every day., Disp: , Rfl:   •  methylphenidate (RITALIN) 20 MG tablet, Take 20 mg by mouth 2 times a day., Disp: , Rfl:   •  trazodone (DESYREL) 150 MG TABS, Take 150 mg by mouth every evening., Disp: , Rfl:   •  ALPRAZolam (XANAX) 0.5 MG Tab, TAKE 1 TAB BY MOUTH TWICE DAILY AND 1 ADDITIONAL TAB DAILY AS NEEDED F43.10, Disp: , Rfl: 2  •  escitalopram (LEXAPRO) 20 MG tablet, Take 20 mg by mouth., Disp: , Rfl: 2      Allergies:  Amoxicillin and Nkda [no known drug allergy]    ROS  Constitutional: Positive for chills, fevers, body aches and malaise/fatigue.   HENT: Positive for congestion and sore throat. Negative for ear pain.    Eyes: Negative for blurred vision, double vision and photophobia.   Respiratory: Positive for cough and sputum production. Negative for hemoptysis, shortness of breath and wheezing.    Cardiovascular: Negative for chest pain and  "palpitations.   Gastrointestinal: Negative for nausea, vomiting, abdominal pain, diarrhea and constipation.   Genitourinary: Negative for dysuria and flank pain.   Musculoskeletal: Negative for falls and myalgias.   Skin: Negative for itching and rash.   Neurological: Positive for headaches. Negative for dizziness and tingling.   Endo/Heme/Allergies: Does not bruise/bleed easily.   Psychiatric/Behavioral: Negative for depression. The patient is not nervous/anxious.             Objective:     /74   Pulse (!) 102   Temp 37 °C (98.6 °F) (Temporal)   Resp 17   Ht 1.626 m (5' 4\")   Wt 97.5 kg (215 lb)   SpO2 97%   BMI 36.90 kg/m²      Physical Exam       Constitutional: PT is oriented to person, place, and time. PT appears well-developed and well-nourished. No distress.   HENT:   Head: Normocephalic and atraumatic.   Right Ear: Hearing, tympanic membrane, external ear and ear canal normal.   Left Ear: Hearing, tympanic membrane, external ear and ear canal normal.   Nose: Mucosal edema, rhinorrhea and sinus tenderness present. Right sinus exhibits frontal sinus tenderness. Left sinus exhibits frontal sinus tenderness.   Mouth/Throat: Uvula is midline. Mucous membranes are pale. Posterior oropharyngeal edema and posterior oropharyngeal erythema with exudate noted on exam.   Eyes: Conjunctivae normal and EOM are normal. Pupils are equal, round, and reactive to light.   Neck: Normal range of motion. Neck supple. No thyromegaly present.   Cardiovascular: Normal rate, regular rhythm, normal heart sounds and intact distal pulses.  Exam reveals no gallop and no friction rub.    No murmur heard.  Pulmonary/Chest: Effort normal and breath sounds normal. No respiratory distress. PT has no wheezes. PT has no rales. PT exhibits no tenderness.   Abdominal: Soft. Bowel sounds are normal. PT exhibits no distension and no mass. There is no tenderness. There is no rebound and no guarding.   Musculoskeletal: Normal range of " motion. PT exhibits no edema and no tenderness.   Lymphadenopathy:     PT has no cervical adenopathy.   Neurological: PT is alert and oriented to person, place, and time. PT displays normal reflexes. No cranial nerve deficit. PT exhibits normal muscle tone. Coordination normal.   Skin: Skin is warm and dry. No rash noted. No erythema.   Psychiatric: PT has a normal mood and affect. PT behavior is normal. Judgment and thought content normal.        Assessment/Plan:       1. Flu-like symptoms    - oseltamivir (TAMIFLU) 75 MG Cap; Take 1 Cap by mouth 2 times a day for 5 days.  Dispense: 10 Cap; Refill: 0  - POCT Influenza A/B    2. Nausea    - ondansetron (ZOFRAN ODT) 4 MG TABLET DISPERSIBLE; Take 1 Tab by mouth every 8 hours as needed for up to 3 days.  Dispense: 10 Tab; Refill: 0      Rest, fluids encouraged.  OTC decongestant for congestion/cough  Note given for work.  AVS with medical info given.  Pt was in full understanding and agreement with the plan.  Differential diagnosis, natural history, supportive care, and indications for immediate follow-up discussed. All questions answered. Patient agrees with the plan of care.  Follow-up as needed if symptoms worsen or fail to improve to PCP, Urgent care or Emergency Room.

## 2019-11-27 NOTE — LETTER
November 27, 2019       Patient: ZAIRA Marion   YOB: 1990   Date of Visit: 11/27/2019         To Whom It May Concern:    It is my medical opinion that ZAIRA Marion may be excused from work for the dates of 11/25/19-11/27/19.      If you have any questions or concerns, please don't hesitate to call [phone]          Sincerely,          Tony Funes P.A.-C.  Electronically Signed

## 2019-11-27 NOTE — PATIENT INSTRUCTIONS
"Influenza, Adult  Influenza (“the flu\") is an infection in the lungs, nose, and throat (respiratory tract). It is caused by a virus. The flu causes many common cold symptoms, as well as a high fever and body aches. It can make you feel very sick.  The flu spreads easily from person to person (is contagious). Getting a flu shot (influenza vaccination) every year is the best way to prevent the flu.  Follow these instructions at home:  · Take over-the-counter and prescription medicines only as told by your doctor.  · Use a cool mist humidifier to add moisture (humidity) to the air in your home. This can make it easier to breathe.  · Rest as needed.  · Drink enough fluid to keep your pee (urine) clear or pale yellow.  · Cover your mouth and nose when you cough or sneeze.  · Wash your hands with soap and water often, especially after you cough or sneeze. If you cannot use soap and water, use hand .  · Stay home from work or school as told by your doctor. Unless you are visiting your doctor, try to avoid leaving home until your fever has been gone for 24 hours without the use of medicine.  · Keep all follow-up visits as told by your doctor. This is important.  How is this prevented?  · Getting a yearly (annual) flu shot is the best way to avoid getting the flu. You may get the flu shot in late summer, fall, or winter. Ask your doctor when you should get your flu shot.  · Wash your hands often or use hand  often.  · Avoid contact with people who are sick during cold and flu season.  · Eat healthy foods.  · Drink plenty of fluids.  · Get enough sleep.  · Exercise regularly.  Contact a doctor if:  · You get new symptoms.  · You have:  ¨ Chest pain.  ¨ Watery poop (diarrhea).  ¨ A fever.  · Your cough gets worse.  · You start to have more mucus.  · You feel sick to your stomach (nauseous).  · You throw up (vomit).  Get help right away if:  · You start to be short of breath or have trouble breathing.  · Your " skin or nails turn a bluish color.  · You have very bad pain or stiffness in your neck.  · You get a sudden headache.  · You get sudden pain in your face or ear.  · You cannot stop throwing up.  This information is not intended to replace advice given to you by your health care provider. Make sure you discuss any questions you have with your health care provider.  Document Released: 09/26/2009 Document Revised: 05/25/2017 Document Reviewed: 10/11/2016  Elsevier Interactive Patient Education © 2017 [employer]

## 2019-11-30 ENCOUNTER — OFFICE VISIT (OUTPATIENT)
Dept: URGENT CARE | Facility: MEDICAL CENTER | Age: 29
End: 2019-11-30
Payer: COMMERCIAL

## 2019-11-30 VITALS
RESPIRATION RATE: 16 BRPM | TEMPERATURE: 98 F | OXYGEN SATURATION: 94 % | WEIGHT: 210.8 LBS | HEIGHT: 64 IN | BODY MASS INDEX: 35.99 KG/M2 | SYSTOLIC BLOOD PRESSURE: 108 MMHG | DIASTOLIC BLOOD PRESSURE: 78 MMHG | HEART RATE: 87 BPM

## 2019-11-30 DIAGNOSIS — R05.9 COUGH: ICD-10-CM

## 2019-11-30 DIAGNOSIS — H92.03 OTALGIA OF BOTH EARS: ICD-10-CM

## 2019-11-30 DIAGNOSIS — J06.9 VIRAL UPPER RESPIRATORY TRACT INFECTION: ICD-10-CM

## 2019-11-30 PROCEDURE — 99214 OFFICE O/P EST MOD 30 MIN: CPT | Performed by: NURSE PRACTITIONER

## 2019-11-30 RX ORDER — BENZONATATE 100 MG/1
CAPSULE ORAL
Qty: 40 CAP | Refills: 0 | Status: SHIPPED | OUTPATIENT
Start: 2019-11-30 | End: 2021-04-01

## 2019-11-30 RX ORDER — FLUTICASONE PROPIONATE 50 MCG
2 SPRAY, SUSPENSION (ML) NASAL DAILY
Qty: 1 BOTTLE | Refills: 0 | Status: SHIPPED | OUTPATIENT
Start: 2019-11-30 | End: 2021-04-01

## 2019-11-30 ASSESSMENT — ENCOUNTER SYMPTOMS
WHEEZING: 0
MYALGIAS: 0
HEADACHES: 1
CHILLS: 0
SWEATS: 0
FEVER: 0
SHORTNESS OF BREATH: 0
COUGH: 1
HEMOPTYSIS: 0
RHINORRHEA: 0
SORE THROAT: 0
HEARTBURN: 0

## 2019-11-30 NOTE — PROGRESS NOTES
"Subjective:      ZAIRA Marion is a 29 y.o. female who presents with Sinusitis (FV from flu dx, pt states not feeling better, chest congestion, cough, sinus congestion)    Reviewed past medical, surgical and family history. Reviewed prescription and OTC medications with patient in electronic health record today      Allergies   Allergen Reactions   • Amoxicillin    • Nkda [No Known Drug Allergy]              Cough   This is a new problem. The current episode started 1 to 4 weeks ago. The problem has been unchanged. The cough is non-productive. Associated symptoms include ear pain, headaches and nasal congestion. Pertinent negatives include no chest pain, chills, ear congestion, fever, heartburn, hemoptysis, myalgias, postnasal drip, rash, rhinorrhea, sore throat, shortness of breath, sweats or wheezing. Nothing aggravates the symptoms. She has tried cool air, OTC cough suppressant and prescription cough suppressant (several RX cough medications with codene , hydrocodone that she had at home. Nothing helps her cough. She was given RX for Tamilfu but then decided not to take it once she picked up RX) for the symptoms. The treatment provided mild relief.    Requesting antibiotic for \" bacterial infection\"         Review of Systems   Constitutional: Negative for chills and fever.   HENT: Positive for ear pain. Negative for postnasal drip, rhinorrhea and sore throat.    Respiratory: Positive for cough. Negative for hemoptysis, shortness of breath and wheezing.    Cardiovascular: Negative for chest pain.   Gastrointestinal: Negative for heartburn.   Musculoskeletal: Negative for myalgias.   Skin: Negative for rash.   Neurological: Positive for headaches.          Objective:     /78 (BP Location: Left arm, Patient Position: Sitting, BP Cuff Size: Adult)   Pulse 87   Temp 36.7 °C (98 °F) (Temporal)   Resp 16   Ht 1.626 m (5' 4\")   Wt 95.6 kg (210 lb 12.8 oz)   SpO2 94%   BMI 36.18 kg/m²      Physical " Exam  Vitals signs and nursing note reviewed.   Constitutional:       General: She is not in acute distress.     Appearance: Normal appearance. She is well-developed. She is not ill-appearing or toxic-appearing.   HENT:      Head: Normocephalic.      Right Ear: Hearing, ear canal and external ear normal. No middle ear effusion. Tympanic membrane is bulging. Tympanic membrane is not injected or erythematous.      Left Ear: Hearing, tympanic membrane, ear canal and external ear normal.      Nose: Rhinorrhea (clear) present. No mucosal edema. Rhinorrhea is clear.      Right Turbinates: Not swollen.      Left Turbinates: Not swollen.      Mouth/Throat:      Lips: Pink.      Mouth: Mucous membranes are moist.      Pharynx: Uvula midline. Posterior oropharyngeal erythema (mild) present. No oropharyngeal exudate.      Tonsils: No tonsillar abscesses.   Eyes:      General: Lids are normal.         Right eye: No discharge.         Left eye: No discharge.      Pupils: Pupils are equal, round, and reactive to light.   Neck:      Musculoskeletal: Full passive range of motion without pain, normal range of motion and neck supple.      Trachea: Trachea normal.   Cardiovascular:      Rate and Rhythm: Normal rate and regular rhythm.      Chest Wall: PMI is not displaced.   Pulmonary:      Effort: Pulmonary effort is normal. No respiratory distress.      Breath sounds: Normal breath sounds. No decreased breath sounds, wheezing, rhonchi or rales.   Abdominal:      Palpations: Abdomen is soft.      Tenderness: There is no tenderness.   Musculoskeletal: Normal range of motion.   Lymphadenopathy:      Head:      Right side of head: No submandibular or tonsillar adenopathy.      Left side of head: No submandibular or tonsillar adenopathy.      Cervical: No cervical adenopathy.      Upper Body:      Right upper body: No supraclavicular adenopathy.      Left upper body: No supraclavicular adenopathy.   Skin:     General: Skin is warm and  dry.   Neurological:      Mental Status: She is alert and oriented to person, place, and time.      Gait: Gait normal.   Psychiatric:         Speech: Speech normal.         Behavior: Behavior normal.           Pertinent previous visit and diagnostic  studies reviewed in EHR.          Assessment/Plan:     1. Viral upper respiratory tract infection  fluticasone (FLONASE) 50 MCG/ACT nasal spray   2. Cough  benzonatate (TESSALON) 100 MG Cap   3. Otalgia of both ears  fluticasone (FLONASE) 50 MCG/ACT nasal spray     Discussed that I felt this was viral in nature. Did not see any evidence of a bacterial process.     Educated in proper administration of medication(s) ordered today including safety, possible SE, risks, benefits, rationale and alternatives to therapy.     Keep well hydrated     Humidifier at night prn     OTC antihistamine of choice. Follow manufactures dosing and safety guidelines.     Return to clinic or PCP  5-7  days if current symptoms are not resolving in a satisfactory manner or sooner if new or worsening symptoms occur. Differential diagnosis, natural history, supportive care, and indications for immediate follow-up discussed at length.   Patient was advised of signs and symptoms which would warrant further evaluation and /or emergent evaluation in ER.  Verbalized agreement with this treatment plan and seemed to understand without barriers. Questions were encouraged and answered to patients satisfaction.     Note for work given to pt.

## 2019-11-30 NOTE — LETTER
November 30, 2019       Patient: ZAIRA Marion   YOB: 1990   Date of Visit: 11/30/2019         To Whom It May Concern:    ZAIRA Marion was seen in urgent care for a medical illness and may return to work on  12/04/19 without work restrictions.               Sincerely,          REYES Kamara  Electronically Signed

## 2020-03-09 ENCOUNTER — OFFICE VISIT (OUTPATIENT)
Dept: MEDICAL GROUP | Facility: LAB | Age: 30
End: 2020-03-09
Payer: COMMERCIAL

## 2020-03-09 VITALS
RESPIRATION RATE: 14 BRPM | BODY MASS INDEX: 38.21 KG/M2 | WEIGHT: 223.8 LBS | HEART RATE: 88 BPM | HEIGHT: 64 IN | OXYGEN SATURATION: 97 % | DIASTOLIC BLOOD PRESSURE: 72 MMHG | TEMPERATURE: 97.9 F | SYSTOLIC BLOOD PRESSURE: 108 MMHG

## 2020-03-09 DIAGNOSIS — R53.82 CHRONIC FATIGUE: ICD-10-CM

## 2020-03-09 DIAGNOSIS — R05.3 CHRONIC COUGH: ICD-10-CM

## 2020-03-09 DIAGNOSIS — R63.5 WEIGHT GAIN: ICD-10-CM

## 2020-03-09 DIAGNOSIS — M54.2 NECK DISCOMFORT: ICD-10-CM

## 2020-03-09 PROCEDURE — 99214 OFFICE O/P EST MOD 30 MIN: CPT | Performed by: INTERNAL MEDICINE

## 2020-03-09 RX ORDER — LORATADINE 10 MG/1
10 TABLET ORAL DAILY
Qty: 30 TAB | Refills: 0 | Status: SHIPPED | OUTPATIENT
Start: 2020-03-09 | End: 2023-03-21

## 2020-03-09 RX ORDER — OMEPRAZOLE 20 MG/1
20 CAPSULE, DELAYED RELEASE ORAL DAILY
Qty: 30 CAP | Refills: 0 | Status: SHIPPED | OUTPATIENT
Start: 2020-03-09 | End: 2021-04-01

## 2020-03-09 ASSESSMENT — PATIENT HEALTH QUESTIONNAIRE - PHQ9: CLINICAL INTERPRETATION OF PHQ2 SCORE: 0

## 2020-03-09 ASSESSMENT — FIBROSIS 4 INDEX: FIB4 SCORE: 0.3

## 2020-03-09 NOTE — PROGRESS NOTES
CC: Emperatriz Flores is a 29 y.o. female is suffering from   Chief Complaint   Patient presents with   • Cough     x 3 months since having flu in November         SUBJECTIVE:  1. Chronic cough  Ca is here for follow-up has had problems with a chronic cough x5 months.  We have discussed possible other causes including esophageal reflux seasonal allergies cough variant asthma.  Will start patient on omeprazole also Claritin    2. Chronic fatigue  Fatigue etiology behind this uncertain we will check thyroid function    3. Weight gain  Weight gain also suspicious for problems with thyroid orders written    4. Neck discomfort  Patient with complaints of neck discomfort we will have patient undergo ultrasound of her thyroid        Past social, family, history:   Social History     Tobacco Use   • Smoking status: Former Smoker     Packs/day: 0.00     Types: Cigarettes     Last attempt to quit: 5/14/2009     Years since quitting: 10.8   • Smokeless tobacco: Never Used   Substance Use Topics   • Alcohol use: No   • Drug use: No         MEDICATIONS:    Current Outpatient Medications:   •  omeprazole (PRILOSEC) 20 MG delayed-release capsule, Take 1 Cap by mouth every day., Disp: 30 Cap, Rfl: 0  •  loratadine (CLARITIN) 10 MG Tab, Take 1 Tab by mouth every day., Disp: 30 Tab, Rfl: 0  •  FLUoxetine (PROZAC) 10 MG Cap, Take 40 mg by mouth every day., Disp: , Rfl:   •  methylphenidate (RITALIN) 20 MG tablet, Take 20 mg by mouth 2 times a day., Disp: , Rfl:   •  trazodone (DESYREL) 150 MG TABS, Take 150 mg by mouth every evening., Disp: , Rfl:   •  benzonatate (TESSALON) 100 MG Cap, 1-2 caps PO TID prn cough (Patient not taking: Reported on 3/9/2020), Disp: 40 Cap, Rfl: 0  •  fluticasone (FLONASE) 50 MCG/ACT nasal spray, Spray 2 Sprays in nose every day. (Patient not taking: Reported on 3/9/2020), Disp: 1 Bottle, Rfl: 0  •  promethazine-dextromethorphan (PROMETHAZINE-DM) 6.25-15 MG/5ML syrup, Take 5 mL by mouth every  "four hours as needed for Cough. (Patient not taking: Reported on 3/9/2020), Disp: 120 mL, Rfl: 0  •  ALPRAZolam (XANAX) 0.5 MG Tab, TAKE 1 TAB BY MOUTH TWICE DAILY AND 1 ADDITIONAL TAB DAILY AS NEEDED F43.10, Disp: , Rfl: 2  •  escitalopram (LEXAPRO) 20 MG tablet, Take 20 mg by mouth., Disp: , Rfl: 2    PROBLEMS:  Patient Active Problem List    Diagnosis Date Noted   • Dyslipidemia 05/31/2019   • Obesity (BMI 35.0-39.9 without comorbidity) (HCC) 03/28/2019   • Migraine with aura and without status migrainosus, not intractable 03/28/2019   • ADHD 03/28/2019   • PTSD (post-traumatic stress disorder) 03/28/2019       REVIEW OF SYSTEMS:  Gen.:  No Nausea, Vomiting, fever, Chills.  Heart: No chest pain.  Lungs:  No shortness of Breath.  Psychological: Cornelius unusual Anxiety depression     PHYSICAL EXAM   Constitutional: Alert, cooperative, not in acute distress.  Cardiovascular:  Rate Rhythm is regular without murmurs rubs clicks.     Thorax & Lungs: Clear to auscultation, no wheezing, rhonchi, or rales  HENT: Normocephalic, Atraumatic.  Eyes: PERRLA, EOMI, Conjunctiva normal.   Neck: Trachia is midline no swelling of the thyroid.   Lymphatic: No lymphadenopathy noted.   Neurologic: Alert & oriented x 3, cranial nerves II through XII are intact, Normal motor function, Normal sensory function, No focal deficits noted.   Psychiatric: Affect normal, Judgment normal, Mood normal.     VITAL SIGNS:/72   Pulse 88   Temp 36.6 °C (97.9 °F) (Temporal)   Resp 14   Ht 1.626 m (5' 4\")   Wt 101.5 kg (223 lb 12.8 oz)   SpO2 97%   BMI 38.42 kg/m²     Labs: Reviewed    Assessment:                                                     Plan:    1. Chronic cough  Chest x-ray ordered free T4 TSH ordered start omeprazole also loratadine  - DX-CHEST-2 VIEWS; Future  - FREE THYROXINE; Future  - TSH; Future  - CBC WITH DIFFERENTIAL; Future  - Comp Metabolic Panel; Future  - LIPID PANEL  - omeprazole (PRILOSEC) 20 MG delayed-release " capsule; Take 1 Cap by mouth every day.  Dispense: 30 Cap; Refill: 0  - loratadine (CLARITIN) 10 MG Tab; Take 1 Tab by mouth every day.  Dispense: 30 Tab; Refill: 0    2. Chronic fatigue  Labs ordered as detailed above    3. Weight gain  Etiology uncertain, suspect underlying thyroid issues diet and exercise recommended  - FREE THYROXINE; Future  - TSH; Future    4. Neck discomfort  Ultrasound neck ordered  - US-THYROID; Future

## 2020-03-14 ENCOUNTER — HOSPITAL ENCOUNTER (OUTPATIENT)
Dept: LAB | Facility: MEDICAL CENTER | Age: 30
End: 2020-03-14
Attending: INTERNAL MEDICINE
Payer: COMMERCIAL

## 2020-03-14 DIAGNOSIS — R05.3 CHRONIC COUGH: ICD-10-CM

## 2020-03-14 DIAGNOSIS — J06.9 UPPER RESPIRATORY TRACT INFECTION, UNSPECIFIED TYPE: ICD-10-CM

## 2020-03-14 DIAGNOSIS — R63.5 WEIGHT GAIN: ICD-10-CM

## 2020-03-14 LAB
ALBUMIN SERPL BCP-MCNC: 4.7 G/DL (ref 3.2–4.9)
ALBUMIN/GLOB SERPL: 1.6 G/DL
ALP SERPL-CCNC: 51 U/L (ref 30–99)
ALT SERPL-CCNC: 20 U/L (ref 2–50)
ANION GAP SERPL CALC-SCNC: 10 MMOL/L (ref 7–16)
AST SERPL-CCNC: 16 U/L (ref 12–45)
BASOPHILS # BLD AUTO: 0.5 % (ref 0–1.8)
BASOPHILS # BLD: 0.04 K/UL (ref 0–0.12)
BILIRUB SERPL-MCNC: 0.3 MG/DL (ref 0.1–1.5)
BUN SERPL-MCNC: 15 MG/DL (ref 8–22)
CALCIUM SERPL-MCNC: 10.2 MG/DL (ref 8.5–10.5)
CHLORIDE SERPL-SCNC: 103 MMOL/L (ref 96–112)
CHOLEST SERPL-MCNC: 253 MG/DL (ref 100–199)
CO2 SERPL-SCNC: 26 MMOL/L (ref 20–33)
CREAT SERPL-MCNC: 0.71 MG/DL (ref 0.5–1.4)
EOSINOPHIL # BLD AUTO: 0.06 K/UL (ref 0–0.51)
EOSINOPHIL NFR BLD: 0.8 % (ref 0–6.9)
ERYTHROCYTE [DISTWIDTH] IN BLOOD BY AUTOMATED COUNT: 41.4 FL (ref 35.9–50)
FASTING STATUS PATIENT QL REPORTED: NORMAL
GLOBULIN SER CALC-MCNC: 3 G/DL (ref 1.9–3.5)
GLUCOSE SERPL-MCNC: 85 MG/DL (ref 65–99)
HCT VFR BLD AUTO: 46 % (ref 37–47)
HDLC SERPL-MCNC: 51 MG/DL
HGB BLD-MCNC: 14.8 G/DL (ref 12–16)
IMM GRANULOCYTES # BLD AUTO: 0.03 K/UL (ref 0–0.11)
IMM GRANULOCYTES NFR BLD AUTO: 0.4 % (ref 0–0.9)
LDLC SERPL CALC-MCNC: 186 MG/DL
LYMPHOCYTES # BLD AUTO: 1.75 K/UL (ref 1–4.8)
LYMPHOCYTES NFR BLD: 23.2 % (ref 22–41)
MCH RBC QN AUTO: 28.1 PG (ref 27–33)
MCHC RBC AUTO-ENTMCNC: 32.2 G/DL (ref 33.6–35)
MCV RBC AUTO: 87.3 FL (ref 81.4–97.8)
MONOCYTES # BLD AUTO: 0.43 K/UL (ref 0–0.85)
MONOCYTES NFR BLD AUTO: 5.7 % (ref 0–13.4)
NEUTROPHILS # BLD AUTO: 5.23 K/UL (ref 2–7.15)
NEUTROPHILS NFR BLD: 69.4 % (ref 44–72)
NRBC # BLD AUTO: 0 K/UL
NRBC BLD-RTO: 0 /100 WBC
PLATELET # BLD AUTO: 303 K/UL (ref 164–446)
PMV BLD AUTO: 10 FL (ref 9–12.9)
POTASSIUM SERPL-SCNC: 4.4 MMOL/L (ref 3.6–5.5)
PROT SERPL-MCNC: 7.7 G/DL (ref 6–8.2)
RBC # BLD AUTO: 5.27 M/UL (ref 4.2–5.4)
SODIUM SERPL-SCNC: 139 MMOL/L (ref 135–145)
T4 FREE SERPL-MCNC: 1.12 NG/DL (ref 0.53–1.43)
TRIGL SERPL-MCNC: 80 MG/DL (ref 0–149)
TSH SERPL DL<=0.005 MIU/L-ACNC: 1.68 UIU/ML (ref 0.38–5.33)
WBC # BLD AUTO: 7.5 K/UL (ref 4.8–10.8)

## 2020-03-14 PROCEDURE — 85025 COMPLETE CBC W/AUTO DIFF WBC: CPT

## 2020-03-14 PROCEDURE — 80053 COMPREHEN METABOLIC PANEL: CPT

## 2020-03-14 PROCEDURE — 84443 ASSAY THYROID STIM HORMONE: CPT

## 2020-03-14 PROCEDURE — 80061 LIPID PANEL: CPT

## 2020-03-14 PROCEDURE — 36415 COLL VENOUS BLD VENIPUNCTURE: CPT

## 2020-03-14 PROCEDURE — 84439 ASSAY OF FREE THYROXINE: CPT

## 2020-03-27 ENCOUNTER — PATIENT MESSAGE (OUTPATIENT)
Dept: MEDICAL GROUP | Facility: LAB | Age: 30
End: 2020-03-27

## 2020-03-29 ENCOUNTER — APPOINTMENT (OUTPATIENT)
Dept: RADIOLOGY | Facility: MEDICAL CENTER | Age: 30
End: 2020-03-29
Attending: INTERNAL MEDICINE
Payer: COMMERCIAL

## 2020-05-14 ENCOUNTER — HOSPITAL ENCOUNTER (OUTPATIENT)
Dept: LAB | Facility: MEDICAL CENTER | Age: 30
End: 2020-05-14
Attending: PSYCHIATRY & NEUROLOGY
Payer: COMMERCIAL

## 2020-05-14 LAB
ALBUMIN SERPL BCP-MCNC: 4.5 G/DL (ref 3.2–4.9)
ALBUMIN/GLOB SERPL: 1.5 G/DL
ALP SERPL-CCNC: 64 U/L (ref 30–99)
ALT SERPL-CCNC: 30 U/L (ref 2–50)
ANION GAP SERPL CALC-SCNC: 12 MMOL/L (ref 7–16)
AST SERPL-CCNC: 25 U/L (ref 12–45)
BASOPHILS # BLD AUTO: 0.7 % (ref 0–1.8)
BASOPHILS # BLD: 0.05 K/UL (ref 0–0.12)
BILIRUB SERPL-MCNC: 0.2 MG/DL (ref 0.1–1.5)
BUN SERPL-MCNC: 18 MG/DL (ref 8–22)
CALCIUM SERPL-MCNC: 9.9 MG/DL (ref 8.5–10.5)
CHLORIDE SERPL-SCNC: 100 MMOL/L (ref 96–112)
CO2 SERPL-SCNC: 24 MMOL/L (ref 20–33)
CREAT SERPL-MCNC: 0.75 MG/DL (ref 0.5–1.4)
EOSINOPHIL # BLD AUTO: 0.09 K/UL (ref 0–0.51)
EOSINOPHIL NFR BLD: 1.3 % (ref 0–6.9)
ERYTHROCYTE [DISTWIDTH] IN BLOOD BY AUTOMATED COUNT: 43.5 FL (ref 35.9–50)
GLOBULIN SER CALC-MCNC: 3.1 G/DL (ref 1.9–3.5)
GLUCOSE SERPL-MCNC: 97 MG/DL (ref 65–99)
HCT VFR BLD AUTO: 46.3 % (ref 37–47)
HGB BLD-MCNC: 15.1 G/DL (ref 12–16)
IMM GRANULOCYTES # BLD AUTO: 0.02 K/UL (ref 0–0.11)
IMM GRANULOCYTES NFR BLD AUTO: 0.3 % (ref 0–0.9)
LYMPHOCYTES # BLD AUTO: 2.31 K/UL (ref 1–4.8)
LYMPHOCYTES NFR BLD: 32.7 % (ref 22–41)
MCH RBC QN AUTO: 29.2 PG (ref 27–33)
MCHC RBC AUTO-ENTMCNC: 32.6 G/DL (ref 33.6–35)
MCV RBC AUTO: 89.4 FL (ref 81.4–97.8)
MONOCYTES # BLD AUTO: 0.44 K/UL (ref 0–0.85)
MONOCYTES NFR BLD AUTO: 6.2 % (ref 0–13.4)
NEUTROPHILS # BLD AUTO: 4.15 K/UL (ref 2–7.15)
NEUTROPHILS NFR BLD: 58.8 % (ref 44–72)
NRBC # BLD AUTO: 0 K/UL
NRBC BLD-RTO: 0 /100 WBC
PLATELET # BLD AUTO: 326 K/UL (ref 164–446)
PMV BLD AUTO: 10.1 FL (ref 9–12.9)
POTASSIUM SERPL-SCNC: 4.6 MMOL/L (ref 3.6–5.5)
PROT SERPL-MCNC: 7.6 G/DL (ref 6–8.2)
RBC # BLD AUTO: 5.18 M/UL (ref 4.2–5.4)
SODIUM SERPL-SCNC: 136 MMOL/L (ref 135–145)
WBC # BLD AUTO: 7.1 K/UL (ref 4.8–10.8)

## 2020-05-14 PROCEDURE — G0480 DRUG TEST DEF 1-7 CLASSES: HCPCS

## 2020-05-14 PROCEDURE — 80307 DRUG TEST PRSMV CHEM ANLYZR: CPT

## 2020-05-14 PROCEDURE — 80053 COMPREHEN METABOLIC PANEL: CPT

## 2020-05-14 PROCEDURE — 85025 COMPLETE CBC W/AUTO DIFF WBC: CPT

## 2020-05-14 PROCEDURE — 36415 COLL VENOUS BLD VENIPUNCTURE: CPT

## 2020-05-16 LAB
AMPHET CTO UR CFM-MCNC: POSITIVE NG/ML
BARBITURATES CTO UR CFM-MCNC: NEGATIVE NG/ML
BENZODIAZ CTO UR CFM-MCNC: NEGATIVE NG/ML
CANNABINOIDS CTO UR CFM-MCNC: NEGATIVE NG/ML
COCAINE CTO UR CFM-MCNC: NEGATIVE NG/ML
DRUG COMMENT 753798: NORMAL
METHADONE CTO UR CFM-MCNC: NEGATIVE NG/ML
OPIATES CTO UR CFM-MCNC: NEGATIVE NG/ML
PCP CTO UR CFM-MCNC: NEGATIVE NG/ML
PROPOXYPH CTO UR CFM-MCNC: NEGATIVE NG/ML

## 2020-05-17 LAB
AMPHET UR CFM-MCNC: 766 NG/ML
MDA UR CFM-MCNC: <200 NG/ML
MDEA UR CFM-MCNC: <200 NG/ML
MDMA UR CFM-MCNC: <200 NG/ML
METHAMPHET UR CFM-MCNC: <200 NG/ML
PHENTERMINE UR CFM-MCNC: <200 NG/ML

## 2020-08-27 ENCOUNTER — HOSPITAL ENCOUNTER (EMERGENCY)
Facility: MEDICAL CENTER | Age: 30
End: 2020-08-28
Attending: EMERGENCY MEDICINE
Payer: COMMERCIAL

## 2020-08-27 DIAGNOSIS — I10 HYPERTENSION, UNSPECIFIED TYPE: ICD-10-CM

## 2020-08-27 DIAGNOSIS — R51.9 NONINTRACTABLE HEADACHE, UNSPECIFIED CHRONICITY PATTERN, UNSPECIFIED HEADACHE TYPE: ICD-10-CM

## 2020-08-27 LAB
ANION GAP SERPL CALC-SCNC: 11 MMOL/L (ref 7–16)
BUN SERPL-MCNC: 17 MG/DL (ref 8–22)
CALCIUM SERPL-MCNC: 9.4 MG/DL (ref 8.4–10.2)
CHLORIDE SERPL-SCNC: 104 MMOL/L (ref 96–112)
CO2 SERPL-SCNC: 23 MMOL/L (ref 20–33)
CREAT SERPL-MCNC: 0.55 MG/DL (ref 0.5–1.4)
GLUCOSE SERPL-MCNC: 92 MG/DL (ref 65–99)
POTASSIUM SERPL-SCNC: 4.2 MMOL/L (ref 3.6–5.5)
SODIUM SERPL-SCNC: 138 MMOL/L (ref 135–145)

## 2020-08-27 PROCEDURE — 700102 HCHG RX REV CODE 250 W/ 637 OVERRIDE(OP): Performed by: EMERGENCY MEDICINE

## 2020-08-27 PROCEDURE — 80048 BASIC METABOLIC PNL TOTAL CA: CPT

## 2020-08-27 PROCEDURE — 99283 EMERGENCY DEPT VISIT LOW MDM: CPT

## 2020-08-27 PROCEDURE — 93005 ELECTROCARDIOGRAM TRACING: CPT

## 2020-08-27 PROCEDURE — A9270 NON-COVERED ITEM OR SERVICE: HCPCS | Performed by: EMERGENCY MEDICINE

## 2020-08-27 PROCEDURE — 93005 ELECTROCARDIOGRAM TRACING: CPT | Performed by: EMERGENCY MEDICINE

## 2020-08-27 RX ORDER — ACETAMINOPHEN 500 MG
1000 TABLET ORAL ONCE
Status: COMPLETED | OUTPATIENT
Start: 2020-08-27 | End: 2020-08-27

## 2020-08-27 RX ORDER — IBUPROFEN 600 MG/1
600 TABLET ORAL ONCE
Status: COMPLETED | OUTPATIENT
Start: 2020-08-27 | End: 2020-08-27

## 2020-08-27 RX ADMIN — ACETAMINOPHEN 1000 MG: 500 TABLET, FILM COATED ORAL at 23:59

## 2020-08-27 RX ADMIN — IBUPROFEN 600 MG: 600 TABLET ORAL at 23:59

## 2020-08-27 ASSESSMENT — FIBROSIS 4 INDEX: FIB4 SCORE: 0.42

## 2020-08-28 VITALS
RESPIRATION RATE: 18 BRPM | TEMPERATURE: 97.4 F | SYSTOLIC BLOOD PRESSURE: 105 MMHG | HEART RATE: 79 BPM | WEIGHT: 231.04 LBS | HEIGHT: 64 IN | BODY MASS INDEX: 39.44 KG/M2 | OXYGEN SATURATION: 96 % | DIASTOLIC BLOOD PRESSURE: 61 MMHG

## 2020-08-28 LAB — EKG IMPRESSION: NORMAL

## 2020-08-28 NOTE — ED TRIAGE NOTES
Pt presents from home for high blood pressure. 173/155 at home, 162/114 at urgent care. Reports anxiety and headache. Pt states she was off of her ritalin for a month and just started taking it again last week. Also reports right sided chest pain. Pt A&Ox4 and ambulatory.     Patient masked. No respiratory symptoms, no recent travel, denies known COVID exposure.

## 2020-08-28 NOTE — DISCHARGE INSTRUCTIONS
You were seen in the ER for high blood pressure and headache.  I did check your kidney function which is normal.  You were given Tylenol and Motrin.  Your blood pressure improved to normal without intervention and I suspect there was a component of anxiety.  You do not require blood pressure medication presently but she should follow-up with a primary care physician.  You can continue taking your Ritalin as prescribed by her psychiatrist but please keep in touch with your psychiatrist about any potential side effects.  Return immediately to the ER with any new or worsening symptoms.  Good luck, I hope you feel better soon!

## 2020-08-28 NOTE — ED PROVIDER NOTES
ED Provider Note    CHIEF COMPLAINT  Chief Complaint   Patient presents with   • Hypertension   • Headache       HPI  Emperatriz Flores is a 30 y.o. female with a history of ADHD, anxiety, depression, and migraine headache who presents with a chief complaint of hypertension and headache.  The patient notes that she has been slowly increasing her dose of Ritalin under the care of a psychiatrist.  She has been on this medication in the past but self discontinued it 1 month ago as she did not care for the way it made her feel.  She restarted the medication 1 week ago and today was her first dose of 40 mg which was her previous dose.  She took her blood pressure at home to ensure that she was tolerating the medication well and noted that her blood pressure was quite elevated.  She initially went to urgent care where her blood pressure was elevated again and she was then sent to the emergency department for evaluation.  She notes significant anxiety around possible undiagnosed hypertension and states she had some twinges of pain in the right side of her chest which is typical with her anxiety.  She has no substernal chest pain, pleuritic chest pain, hemoptysis, or shortness of breath.  She does note a headache and is currently menstruating which is typical for her.  She has a history of migraine headaches but denies that this feels similar.  It is not the worst headache of her life, she denies any extremity weakness or numbness, or vision changes.  She did try some Tylenol and Motrin earlier today which did not significantly improve her.    REVIEW OF SYSTEMS  See HPI for further details.  Hypertension.  Headache.  All other systems are negative.     PAST MEDICAL HISTORY   has a past medical history of ADHD, Allergy, Anxiety, Asthma, Depression, and Migraine.    SOCIAL HISTORY  Social History     Tobacco Use   • Smoking status: Former Smoker     Packs/day: 0.00     Types: Cigarettes     Quit date: 5/14/2009     Years  "since quittin.2   • Smokeless tobacco: Never Used   Substance and Sexual Activity   • Alcohol use: No   • Drug use: No   • Sexual activity: Yes     Birth control/protection: Condom       SURGICAL HISTORY  patient denies any surgical history    CURRENT MEDICATIONS  Home Medications     Reviewed by Denice Magdaleno R.N. (Registered Nurse) on 20 at 2221  Med List Status: Not Addressed   Medication Last Dose Status   ALPRAZolam (XANAX) 0.5 MG Tab  Active   benzonatate (TESSALON) 100 MG Cap  Active   escitalopram (LEXAPRO) 20 MG tablet  Active   FLUoxetine (PROZAC) 10 MG Cap  Active   fluticasone (FLONASE) 50 MCG/ACT nasal spray  Active   loratadine (CLARITIN) 10 MG Tab  Active   methylphenidate (RITALIN) 20 MG tablet  Active   omeprazole (PRILOSEC) 20 MG delayed-release capsule  Active   promethazine-dextromethorphan (PROMETHAZINE-DM) 6.25-15 MG/5ML syrup  Active   trazodone (DESYREL) 150 MG TABS  Active                ALLERGIES  Allergies   Allergen Reactions   • Amoxicillin    • Nkda [No Known Drug Allergy]        PHYSICAL EXAM  VITAL SIGNS: /101   Pulse 94   Temp 36.5 °C (97.7 °F) (Temporal)   Resp 18   Ht 1.626 m (5' 4\")   Wt 104.8 kg (231 lb 0.7 oz)   LMP 2020   SpO2 97%   BMI 39.66 kg/m²    Pulse ox interpretation: I interpret this pulse ox as normal.  Constitutional: Alert in no apparent distress.  HENT: No signs of trauma, Bilateral external ears normal, Nose normal.  Moist mucous membranes.  Eyes: Pupils are equal and reactive, Conjunctiva normal, Non-icteric.   Neck: Normal range of motion, No tenderness, Supple, No stridor.   Lymphatic: No lymphadenopathy noted.   Cardiovascular: Regular rate and rhythm, no murmurs.   Thorax & Lungs: Normal breath sounds, No respiratory distress, No wheezing, No chest tenderness.   Abdomen: Bowel sounds normal, Soft, No tenderness, No masses, No pulsatile masses. No peritoneal signs.  Skin: Warm, Dry, No erythema, No rash.   Back: Normal " alignment.  Extremities: Intact distal pulses, No edema, No tenderness, No cyanosis.  Musculoskeletal: Good range of motion in all major joints. No tenderness to palpation or major deformities noted.   Neurologic: Alert, Normal motor function, Normal sensory function, No focal deficits noted.   Psychiatric: Anxious but pleasant and cooperative.     DIAGNOSTIC STUDIES / PROCEDURES    EKG  Results for orders placed or performed during the hospital encounter of 20   EKG   Result Value Ref Range    Report       [facility]    Test Date:  2020  Pt Name:    ISABEL KENT            Department: [facility]  MRN:        [id number]                      Room:  Gender:     Female                       Technician: JR  :        1990                   Requested By:ER TRIAGE PROTOCOL  Order #:    [id number]                    Reading MD:    Measurements  Intervals                                Axis  Rate:       76                           P:          53  DE:         157                          QRS:        76  QRSD:       85                           T:          66  QT:         361  QTc:        406    Interpretive Statements  Sinus rhythm  No previous ECG available for comparison       LABS  BMP    COURSE & MEDICAL DECISION MAKING  Pertinent Labs & Imaging studies reviewed. (See chart for details)  This is a 30-year-old female who was sent here from urgent care due to headache and hypertension.  She does arrive hypertensive to the ER but afebrile with otherwise normal vital signs.  She appears well-hydrated and nontoxic.  She has a history of migraine headaches but denies that this is similar.  This is not the worst headache of her life and there was no thunderclap onset.  She is currently menstruating and notes that she typically gets headaches while she menstruates which is consistent with today's symptoms.  She does have a history of anxiety and is very anxious presently  but very pleasant and cooperative.  She has no neurologic symptoms or vision changes.  She had a twinge of right-sided chest pain which is typical for her anxiety attacks.  Otherwise, I have a low suspicion for ACS and an EKG was performed in triage which does not suggest ischemia.  I do not feel that her symptoms are consistent with ACS.  Blood was drawn upon her initial arrival so a BMP was ordered to check creatinine and this was well within normal limits.  She was given Tylenol and Motrin for her headache.    Patient's blood pressure after my initial evaluation improved to 119/77.  She does not require initiation of antihypertensives in the emergency department but should follow-up with a primary care physician.  She was reassured and is comfortable with this plan.  discharged in good and stable condition with strict return precautions.    The patient will return for worsening symptoms and is stable at the time of discharge. The patient verbalizes understanding and will comply.    FINAL IMPRESSION  1. Nonintractable headache, unspecified chronicity pattern, unspecified headache type     2. Hypertension, unspecified type           Electronically signed by: Melecio Martinez M.D., 8/27/2020 11:08 PM

## 2020-08-28 NOTE — ED NOTES
Pt given discharge instructions including to call scheduling to get set up with new APN and return if worse. Pt verbalized understanding of instructions.  Ambulated out with spouse.

## 2021-04-01 ENCOUNTER — OFFICE VISIT (OUTPATIENT)
Dept: MEDICAL GROUP | Facility: PHYSICIAN GROUP | Age: 31
End: 2021-04-01
Payer: COMMERCIAL

## 2021-04-01 VITALS
HEIGHT: 64 IN | OXYGEN SATURATION: 100 % | SYSTOLIC BLOOD PRESSURE: 118 MMHG | TEMPERATURE: 98.4 F | BODY MASS INDEX: 39.09 KG/M2 | RESPIRATION RATE: 20 BRPM | HEART RATE: 74 BPM | DIASTOLIC BLOOD PRESSURE: 68 MMHG | WEIGHT: 229 LBS

## 2021-04-01 DIAGNOSIS — E66.9 OBESITY (BMI 30-39.9): ICD-10-CM

## 2021-04-01 DIAGNOSIS — Z76.89 ENCOUNTER TO ESTABLISH CARE: ICD-10-CM

## 2021-04-01 DIAGNOSIS — F33.0 MILD EPISODE OF RECURRENT MAJOR DEPRESSIVE DISORDER (HCC): ICD-10-CM

## 2021-04-01 DIAGNOSIS — E78.5 DYSLIPIDEMIA: ICD-10-CM

## 2021-04-01 DIAGNOSIS — F41.1 GAD (GENERALIZED ANXIETY DISORDER): ICD-10-CM

## 2021-04-01 DIAGNOSIS — G43.109 MIGRAINE WITH AURA AND WITHOUT STATUS MIGRAINOSUS, NOT INTRACTABLE: ICD-10-CM

## 2021-04-01 DIAGNOSIS — Z13.21 ENCOUNTER FOR VITAMIN DEFICIENCY SCREENING: ICD-10-CM

## 2021-04-01 DIAGNOSIS — F43.10 POSTTRAUMATIC STRESS DISORDER: ICD-10-CM

## 2021-04-01 DIAGNOSIS — F90.0 ATTENTION DEFICIT HYPERACTIVITY DISORDER (ADHD), PREDOMINANTLY INATTENTIVE TYPE: ICD-10-CM

## 2021-04-01 DIAGNOSIS — G47.09 OTHER INSOMNIA: ICD-10-CM

## 2021-04-01 PROCEDURE — 99214 OFFICE O/P EST MOD 30 MIN: CPT | Performed by: NURSE PRACTITIONER

## 2021-04-01 ASSESSMENT — ANXIETY QUESTIONNAIRES
3. WORRYING TOO MUCH ABOUT DIFFERENT THINGS: SEVERAL DAYS
2. NOT BEING ABLE TO STOP OR CONTROL WORRYING: NOT AT ALL
GAD7 TOTAL SCORE: 4
5. BEING SO RESTLESS THAT IT IS HARD TO SIT STILL: NOT AT ALL
7. FEELING AFRAID AS IF SOMETHING AWFUL MIGHT HAPPEN: NOT AT ALL
1. FEELING NERVOUS, ANXIOUS, OR ON EDGE: SEVERAL DAYS
6. BECOMING EASILY ANNOYED OR IRRITABLE: SEVERAL DAYS
4. TROUBLE RELAXING: SEVERAL DAYS

## 2021-04-01 ASSESSMENT — FIBROSIS 4 INDEX: FIB4 SCORE: 0.42

## 2021-04-01 ASSESSMENT — PATIENT HEALTH QUESTIONNAIRE - PHQ9: CLINICAL INTERPRETATION OF PHQ2 SCORE: 0

## 2021-04-01 NOTE — ASSESSMENT & PLAN NOTE
Chronic medical problem.  Her dajuan score today is 4. She is in process of titrating her fluoxetine dose down.  She is followed by her psychiatrist.  She denies any self-harm or SI today.

## 2021-04-01 NOTE — ASSESSMENT & PLAN NOTE
Chronic medical problem.  She takes over-the-counter medication for her migraines.  She states that her  has been researching and she would like to have lead level checked.  That she lives in was built prior to 1972.  No acute complaints today.

## 2021-04-01 NOTE — ASSESSMENT & PLAN NOTE
Chronic medical problem.  She is not taking any cholesterol-lowering medication.  She does have family history of hyperlipidemia.  She has questions if she can have a cardiac CT score ordered today.  She is due for updated labs.  Last lab results:  Results for ISABEL KENT (MRN [id number]) as of 4/1/2021 09:47   Ref. Range 3/14/2020 10:24   Cholesterol,Tot Latest Ref Range: 100 - 199 mg/dL 253 (H)   Triglycerides Latest Ref Range: 0 - 149 mg/dL 80   HDL Latest Ref Range: >=40 mg/dL 51   LDL Latest Ref Range: <100 mg/dL 186 (H)      DISPLAY PLAN FREE TEXT

## 2021-04-01 NOTE — ASSESSMENT & PLAN NOTE
Chronic medical problem.  She is currently working on tapering her dose of fluoxetine.  Her starting dose of was fluoxetine 40 mg. She is currently taking fluoxetine 20 mg. She is followed by Dr. Marva Salmon, psychiatry, [facility]. She has upcoming appointment in 2 weeks.

## 2021-04-01 NOTE — PROGRESS NOTES
Subjective:     CC:  Diagnoses of Encounter to establish care, PTSD (post-traumatic stress disorder), Mild episode of recurrent major depressive disorder (HCC), ABDON (generalized anxiety disorder), Dyslipidemia, Attention deficit hyperactivity disorder (ADHD), predominantly inattentive type, Other insomnia, Migraine with aura and without status migrainosus, not intractable, Obesity (BMI 30-39.9), and Encounter for vitamin deficiency screening were pertinent to this visit.    HISTORY OF THE PRESENT ILLNESS: Patient is a 30 y.o. female. This pleasant patient is here today to establish care and discuss the following. Her prior PCP was Radha Granger .    PTSD (post-traumatic stress disorder)  Chronic medical problem.  She is currently working on tapering her dose of fluoxetine.  Her starting dose of was fluoxetine 40 mg. She is currently taking fluoxetine 20 mg. She is followed by Dr. Marva Salmon, psychiatry, at [facility]. She has upcoming appointment in 2 weeks.     Other insomnia  Chronic medical problem.  She is taking trazodone at nighttime, she is unsure of dose.  She does take the medication nightly. She is followed by her psychiatrist.     Dyslipidemia  Chronic medical problem.  She is not taking any cholesterol-lowering medication.  She does have family history of hyperlipidemia.  She has questions if she can have a cardiac CT score ordered today.  She is due for updated labs.  Last lab results:  Results for ISABEL KENT (MRN [id number]) as of 4/1/2021 09:47   Ref. Range 3/14/2020 10:24   Cholesterol,Tot Latest Ref Range: 100 - 199 mg/dL 253 (H)   Triglycerides Latest Ref Range: 0 - 149 mg/dL 80   HDL Latest Ref Range: >=40 mg/dL 51   LDL Latest Ref Range: <100 mg/dL 186 (H)       ADHD  Chronic medical problem.  She has been on ritalin in the past. She has been off ritalin since August 2020 after she developed elevated blood pressure. She is following with her psychiatrist.      Migraine with aura and without status migrainosus, not intractable  Chronic medical problem.  She takes over-the-counter medication for her migraines.  She states that her  has been researching and she would like to have lead level checked.  That she lives in was built prior to .  No acute complaints today.    ABDON (generalized anxiety disorder)  Chronic medical problem.  Her abdon score today is 4. She is in process of titrating her fluoxetine dose down.  She is followed by her psychiatrist.  She denies any self-harm or SI today.    Mild episode of recurrent major depressive disorder (HCC)  Chronic medical problem.  PHQ score today 0.  She is taking fluoxetine 20 mg daily, she is in process of decreasing this dose with her psychiatrist.  She denies any self-harm or SI today.    Obesity (BMI 30-39.9)  Chronic medical problem.  Her BMI today is 39.31 kg/m².      Allergies: Amoxicillin and Nkda [no known drug allergy]    Current Outpatient Medications Ordered in Epic   Medication Sig Dispense Refill   • loratadine (CLARITIN) 10 MG Tab Take 1 Tab by mouth every day. 30 Tab 0   • FLUoxetine (PROZAC) 10 MG Cap Take 20 mg by mouth every day.     • trazodone (DESYREL) 150 MG TABS Take 150 mg by mouth every evening.       No current Epic-ordered facility-administered medications on file.       Past Medical History:   Diagnosis Date   • ADHD    • Allergy    • Anxiety    • Asthma    • Depression    • Hyperlipidemia    • Migraine    • PTSD (post-traumatic stress disorder)     age 17       Past Surgical History:   Procedure Laterality Date   • OTHER  2009    wisdom teeth       Social History     Tobacco Use   • Smoking status: Former Smoker     Packs/day: 0.25     Years: 2.00     Pack years: 0.50     Types: Cigarettes     Quit date: 2009     Years since quittin.8   • Smokeless tobacco: Never Used   Substance Use Topics   • Alcohol use: No   • Drug use: No       Social History     Social History Narrative  "  • Not on file       Family History   Problem Relation Age of Onset   • Arthritis Mother    • Psychiatric Illness Mother    • Hyperlipidemia Mother    • Cancer Mother         skin   • Alcohol/Drug Father    • Psychiatric Illness Father    • No Known Problems Brother    • Hyperlipidemia Paternal Grandfather    • Hypertension Paternal Grandfather    • Psychiatric Illness Paternal Grandfather        Health Maintenance: Completed    ROS:   Gen: no fevers/chills, no changes in weight, denies any self harm or SI  Pulm: no shortness of breath  CV: no chest pain  GI: no nausea/vomiting  : no dysuria  MSk: no myalgias  Skin: no rash  Neuro: no headaches, no dizziness       Objective:     Vital signs reviewed   Exam: /68 (BP Location: Left arm, Patient Position: Sitting, BP Cuff Size: Adult)   Pulse 74   Temp 36.9 °C (98.4 °F) (Temporal)   Resp 20   Ht 1.626 m (5' 4\")   Wt 104 kg (229 lb)   SpO2 100%  Body mass index is 39.31 kg/m².    General: Normal appearing. No distress.  Eyes: Eyes conjunctiva clear lids without ptosis, lids normal. Glasses in place.   Neck: Supple without JVD. Thyroid is not enlarged.  Pulmonary: Clear to ausculation.  Normal effort. No rales, ronchi, or wheezing.  Cardiovascular: Regular rate and rhythm without murmur. Radial pulses are intact and equal bilaterally.  Abdomen: Soft, nontender, nondistended.   Neurologic: Grossly nonfocal  Lymph: No cervical or supraclavicular lymph nodes are palpable  Skin: Warm and dry.  No obvious lesions.  Musculoskeletal: Normal gait. No extremity cyanosis, clubbing, or edema.  Psych: Normal mood and affect. Alert and oriented x3. Judgment and insight is normal.      Assessment & Plan:   30 y.o. female with the following -    1. Encounter to establish care  Acute uncomplicated problem.  Care established today.    2. PTSD (post-traumatic stress disorder)  Chronic stable problem.  Continue fluoxetine.  PHQ score and dajuan score discussed and reviewed " today.  Continue follow-up with psychiatry.    3. Mild episode of recurrent major depressive disorder (HCC)  Chronic stable problem.  PHQ score reviewed today.  Continue with fluoxetine.  Continue follow-up with psychiatry.  She is interested in hormone labs, orders placed.  She denies any self-harm or SI today.  Red flags discussed.  She will return in 3 months for follow-up.  - FSH, SERUM  - ESTRADIOL; Future  - PROLACTIN; Future    4. ABDON (generalized anxiety disorder)  Chronic stable problem.  Abdon score reviewed today.  Continue with fluoxetine.  Continue follow-up with psychiatry.  Check hormone labs as ordered.  She denies any self-harm or SI today.  Red flags discussed.  She will return in 3 months for follow-up.  - FSH, SERUM  - ESTRADIOL; Future  - PROLACTIN; Future    5. Dyslipidemia  Chronic stable problem.  She is due for updated labs.  Orders placed.  Discussed that if her labs remain elevated can consider cardiac CT scoring.  Follow-up via [employer].  - CBC WITH DIFFERENTIAL; Future  - Comp Metabolic Panel; Future  - Lipid Profile; Future    6. Attention deficit hyperactivity disorder (ADHD), predominantly inattentive type  Chronic stable problem.  Continue follow-up with psychiatry.  Will check thyroid level any hyperthyroid.  Follow-up via [employer].  - TSH WITH REFLEX TO FT4; Future    7. Other insomnia  Chronic stable problem.  Continue trazodone.  Continue follow-up with psychiatry.  No acute complaints today.    8. Migraine with aura and without status migrainosus, not intractable  Chronic stable problem.  Continue over-the-counter medications as needed.  Will check lead level.  Monitor and follow-up via [employer].  - LEAD, BLOOD; Future    9. Obesity (BMI 30-39.9)  Chronic stable problem.  Due for updated labs.  Orders placed.  - Lipid Profile; Future  - TSH WITH REFLEX TO FT4; Future  - Patient identified as having weight management issue.  Appropriate orders and counseling given.    10. Encounter  for vitamin deficiency screening  Acute uncomplicated problem.  Patient interested in vitamin screening, orders placed.  Monitor follow-up via [employer].  - VITAMIN B12; Future  - VITAMIN D,25 HYDROXY; Future      Return in about 3 months (around 7/1/2021) for medication management .    Please note that this dictation was created using voice recognition software. I have made every reasonable attempt to correct obvious errors, but I expect that there are errors of grammar and possibly content that I did not discover before finalizing the note.

## 2021-04-01 NOTE — ASSESSMENT & PLAN NOTE
Chronic medical problem.  PHQ score today 0.  She is taking fluoxetine 20 mg daily, she is in process of decreasing this dose with her psychiatrist.  She denies any self-harm or SI today.

## 2021-04-01 NOTE — ASSESSMENT & PLAN NOTE
Chronic medical problem.  She is taking trazodone at nighttime, she is unsure of dose.  She does take the medication nightly. She is followed by her psychiatrist.

## 2021-04-01 NOTE — ASSESSMENT & PLAN NOTE
Chronic medical problem.  She has been on ritalin in the past. She has been off ritalin since August 2020 after she developed elevated blood pressure. She is following with her psychiatrist.

## 2021-05-19 ENCOUNTER — HOSPITAL ENCOUNTER (OUTPATIENT)
Dept: LAB | Facility: MEDICAL CENTER | Age: 31
End: 2021-05-19
Attending: NURSE PRACTITIONER
Payer: COMMERCIAL

## 2021-05-19 DIAGNOSIS — E66.9 OBESITY (BMI 30-39.9): ICD-10-CM

## 2021-05-19 DIAGNOSIS — Z13.21 ENCOUNTER FOR VITAMIN DEFICIENCY SCREENING: ICD-10-CM

## 2021-05-19 DIAGNOSIS — E78.5 DYSLIPIDEMIA: ICD-10-CM

## 2021-05-19 DIAGNOSIS — F41.1 GAD (GENERALIZED ANXIETY DISORDER): ICD-10-CM

## 2021-05-19 DIAGNOSIS — F33.0 MILD EPISODE OF RECURRENT MAJOR DEPRESSIVE DISORDER (HCC): ICD-10-CM

## 2021-05-19 DIAGNOSIS — F90.0 ATTENTION DEFICIT HYPERACTIVITY DISORDER (ADHD), PREDOMINANTLY INATTENTIVE TYPE: ICD-10-CM

## 2021-05-19 DIAGNOSIS — G43.109 MIGRAINE WITH AURA AND WITHOUT STATUS MIGRAINOSUS, NOT INTRACTABLE: ICD-10-CM

## 2021-05-19 LAB
ALBUMIN SERPL BCP-MCNC: 4.3 G/DL (ref 3.2–4.9)
ALBUMIN/GLOB SERPL: 1.5 G/DL
ALP SERPL-CCNC: 60 U/L (ref 30–99)
ALT SERPL-CCNC: 23 U/L (ref 2–50)
ANION GAP SERPL CALC-SCNC: 10 MMOL/L (ref 7–16)
AST SERPL-CCNC: 14 U/L (ref 12–45)
BASOPHILS # BLD AUTO: 0.6 % (ref 0–1.8)
BASOPHILS # BLD: 0.04 K/UL (ref 0–0.12)
BILIRUB SERPL-MCNC: 0.2 MG/DL (ref 0.1–1.5)
BUN SERPL-MCNC: 17 MG/DL (ref 8–22)
CALCIUM SERPL-MCNC: 9.8 MG/DL (ref 8.5–10.5)
CHLORIDE SERPL-SCNC: 105 MMOL/L (ref 96–112)
CHOLEST SERPL-MCNC: 255 MG/DL (ref 100–199)
CO2 SERPL-SCNC: 25 MMOL/L (ref 20–33)
CREAT SERPL-MCNC: 0.64 MG/DL (ref 0.5–1.4)
EOSINOPHIL # BLD AUTO: 0.08 K/UL (ref 0–0.51)
EOSINOPHIL NFR BLD: 1.1 % (ref 0–6.9)
ERYTHROCYTE [DISTWIDTH] IN BLOOD BY AUTOMATED COUNT: 43.1 FL (ref 35.9–50)
FASTING STATUS PATIENT QL REPORTED: NORMAL
FSH SERPL-ACNC: 5.1 MIU/ML
GLOBULIN SER CALC-MCNC: 2.8 G/DL (ref 1.9–3.5)
GLUCOSE SERPL-MCNC: 87 MG/DL (ref 65–99)
HCT VFR BLD AUTO: 46.6 % (ref 37–47)
HDLC SERPL-MCNC: 45 MG/DL
HGB BLD-MCNC: 14.6 G/DL (ref 12–16)
IMM GRANULOCYTES # BLD AUTO: 0.03 K/UL (ref 0–0.11)
IMM GRANULOCYTES NFR BLD AUTO: 0.4 % (ref 0–0.9)
LDLC SERPL CALC-MCNC: 190 MG/DL
LYMPHOCYTES # BLD AUTO: 2.22 K/UL (ref 1–4.8)
LYMPHOCYTES NFR BLD: 31 % (ref 22–41)
MCH RBC QN AUTO: 27.2 PG (ref 27–33)
MCHC RBC AUTO-ENTMCNC: 31.3 G/DL (ref 33.6–35)
MCV RBC AUTO: 86.9 FL (ref 81.4–97.8)
MONOCYTES # BLD AUTO: 0.39 K/UL (ref 0–0.85)
MONOCYTES NFR BLD AUTO: 5.4 % (ref 0–13.4)
NEUTROPHILS # BLD AUTO: 4.4 K/UL (ref 2–7.15)
NEUTROPHILS NFR BLD: 61.5 % (ref 44–72)
NRBC # BLD AUTO: 0 K/UL
NRBC BLD-RTO: 0 /100 WBC
PLATELET # BLD AUTO: 302 K/UL (ref 164–446)
PMV BLD AUTO: 10.5 FL (ref 9–12.9)
POTASSIUM SERPL-SCNC: 4.5 MMOL/L (ref 3.6–5.5)
PROLACTIN SERPL-MCNC: 11.9 NG/ML (ref 2.8–26)
PROT SERPL-MCNC: 7.1 G/DL (ref 6–8.2)
RBC # BLD AUTO: 5.36 M/UL (ref 4.2–5.4)
SODIUM SERPL-SCNC: 140 MMOL/L (ref 135–145)
TRIGL SERPL-MCNC: 98 MG/DL (ref 0–149)
TSH SERPL DL<=0.005 MIU/L-ACNC: 1.78 UIU/ML (ref 0.38–5.33)
VIT B12 SERPL-MCNC: 1180 PG/ML (ref 211–911)
WBC # BLD AUTO: 7.2 K/UL (ref 4.8–10.8)

## 2021-05-19 PROCEDURE — 84443 ASSAY THYROID STIM HORMONE: CPT

## 2021-05-19 PROCEDURE — 36415 COLL VENOUS BLD VENIPUNCTURE: CPT

## 2021-05-19 PROCEDURE — 80053 COMPREHEN METABOLIC PANEL: CPT

## 2021-05-19 PROCEDURE — 82607 VITAMIN B-12: CPT

## 2021-05-19 PROCEDURE — 80061 LIPID PANEL: CPT

## 2021-05-19 PROCEDURE — 83655 ASSAY OF LEAD: CPT

## 2021-05-19 PROCEDURE — 85025 COMPLETE CBC W/AUTO DIFF WBC: CPT

## 2021-05-19 PROCEDURE — 83001 ASSAY OF GONADOTROPIN (FSH): CPT

## 2021-05-19 PROCEDURE — 82306 VITAMIN D 25 HYDROXY: CPT

## 2021-05-19 PROCEDURE — 82670 ASSAY OF TOTAL ESTRADIOL: CPT

## 2021-05-19 PROCEDURE — 84146 ASSAY OF PROLACTIN: CPT

## 2021-05-20 LAB — ESTRADIOL SERPL-MCNC: 56 PG/ML

## 2021-05-21 LAB
25(OH)D3 SERPL-MCNC: 39 NG/ML (ref 30–80)
LEAD BLDV-MCNC: <2 UG/DL

## 2021-07-01 ENCOUNTER — TELEMEDICINE (OUTPATIENT)
Dept: MEDICAL GROUP | Facility: PHYSICIAN GROUP | Age: 31
End: 2021-07-01
Payer: COMMERCIAL

## 2021-07-01 VITALS
BODY MASS INDEX: 38.41 KG/M2 | WEIGHT: 225 LBS | SYSTOLIC BLOOD PRESSURE: 105 MMHG | DIASTOLIC BLOOD PRESSURE: 62 MMHG | HEIGHT: 64 IN | HEART RATE: 67 BPM

## 2021-07-01 DIAGNOSIS — F33.0 MILD EPISODE OF RECURRENT MAJOR DEPRESSIVE DISORDER (HCC): ICD-10-CM

## 2021-07-01 DIAGNOSIS — Z91.89 OTHER SPECIFIED PERSONAL RISK FACTORS, NOT ELSEWHERE CLASSIFIED: ICD-10-CM

## 2021-07-01 DIAGNOSIS — E78.5 DYSLIPIDEMIA: ICD-10-CM

## 2021-07-01 DIAGNOSIS — G43.109 MIGRAINE WITH AURA AND WITHOUT STATUS MIGRAINOSUS, NOT INTRACTABLE: ICD-10-CM

## 2021-07-01 DIAGNOSIS — G47.09 OTHER INSOMNIA: ICD-10-CM

## 2021-07-01 DIAGNOSIS — N92.6 IRREGULAR PERIODS: ICD-10-CM

## 2021-07-01 PROCEDURE — 99214 OFFICE O/P EST MOD 30 MIN: CPT | Mod: 95 | Performed by: NURSE PRACTITIONER

## 2021-07-01 RX ORDER — FLUOXETINE HYDROCHLORIDE 40 MG/1
30 CAPSULE ORAL
COMMUNITY
Start: 2021-06-25 | End: 2023-03-21

## 2021-07-01 RX ORDER — GUANFACINE 1 MG/1
0.5 TABLET ORAL DAILY
COMMUNITY
End: 2023-03-21

## 2021-07-01 RX ORDER — SUMATRIPTAN 25 MG/1
25 TABLET, FILM COATED ORAL
Qty: 10 TABLET | Refills: 3 | Status: SHIPPED | OUTPATIENT
Start: 2021-07-01 | End: 2023-03-21

## 2021-07-01 ASSESSMENT — FIBROSIS 4 INDEX: FIB4 SCORE: 0.3

## 2021-07-01 NOTE — ASSESSMENT & PLAN NOTE
Chronic medical problem. She is taking fluoxetine 40 mg daily. She had tried titration of her fluoxetine dose down but did not tolerate. She is followed by her psychiatrist, Dr. Marva Salmon. She denies any self harm or SI today.

## 2021-07-01 NOTE — ASSESSMENT & PLAN NOTE
Acute medical problem. For the last several months her periods have been irregular. She has been reading COVID pfizer side effects and abnormal periods, she has questions if this is a cause of abnormal periods. She has been on birth control in the past but reports she did not tolerate in the past. She is interested in IUD and referral to GYN.

## 2021-07-01 NOTE — ASSESSMENT & PLAN NOTE
Chronic medical problem. She had recent labs that she would like to review. She has family history of dyslipidemia. She eats red meat 4-5 times a week. She has started lifting weights daily and walk daily. She is interested in CT cardiac scoring. Last lab results:  Results for ISABEL KENT (MRN [id number]) as of 7/1/2021 10:04   Ref. Range 5/19/2021 08:43   Cholesterol,Tot Latest Ref Range: 100 - 199 mg/dL 255 (H)   Triglycerides Latest Ref Range: 0 - 149 mg/dL 98   HDL Latest Ref Range: >=40 mg/dL 45   LDL Latest Ref Range: <100 mg/dL 190 (H)

## 2021-07-01 NOTE — PROGRESS NOTES
Virtual Visit: Established Patient   This visit was conducted via Zoom using secure and encrypted videoconferencing technology. The patient was in a private location in the state [facility].    The patient's identity was confirmed and verbal consent was obtained for this virtual visit.    Subjective:   CC:   Chief Complaint   Patient presents with   • Follow-Up   • Lab Results       Isabel Kent is a 31 y.o. female presenting for evaluation and management of:    Dyslipidemia  Chronic medical problem. She had recent labs that she would like to review. She has family history of dyslipidemia. She eats red meat 4-5 times a week. She has started lifting weights daily and walk daily. She is interested in CT cardiac scoring. Last lab results:  Results for ISABEL KENT (MRN [id number]) as of 7/1/2021 10:04   Ref. Range 5/19/2021 08:43   Cholesterol,Tot Latest Ref Range: 100 - 199 mg/dL 255 (H)   Triglycerides Latest Ref Range: 0 - 149 mg/dL 98   HDL Latest Ref Range: >=40 mg/dL 45   LDL Latest Ref Range: <100 mg/dL 190 (H)       Irregular periods  Acute medical problem. For the last several months her periods have been irregular. She has been reading COVID pfizer side effects and abnormal periods, she has questions if this is a cause of abnormal periods. She has been on birth control in the past but reports she did not tolerate in the past. She is interested in IUD and referral to GYN.    Mild episode of recurrent major depressive disorder (HCC)  Chronic medical problem. She is taking fluoxetine 40 mg daily. She had tried titration of her fluoxetine dose down but did not tolerate. She is followed by her psychiatrist, Dr. Marva Salmon. She denies any self harm or SI today.    Other insomnia  Chronic medical problem. She using trazodone nightly. This is helping her sleep.    Migraine with aura and without status migrainosus, not intractable  Chronic medical problem She states she was previously prescribed  a blood pressure medication in the past for her migraines but did not tolerate as it made her blood pressure low. She gets monthly migraines with her menstrual cycle. She takes OTC medications. She will get light sensitivity prior to her migraines. She tries to limit wheat as it can trigger. She would like to discuss starting Imitrex.         ROS   Denies any recent fevers or chills. No nausea or vomiting. No chest pains or shortness of breath. Positive for migraines.     Allergies   Allergen Reactions   • Amoxicillin    • Dilaudid [Hydromorphone Hcl] Itching       Current medicines (including changes today)  Current Outpatient Medications   Medication Sig Dispense Refill   • fluoxetine (PROZAC) 40 MG capsule Take 40 mg by mouth.     • guanFACINE (TENEX) 1 MG Tab Take 0.5 mg by mouth every day.     • SUMAtriptan (IMITREX) 25 MG Tab tablet Take 1 tablet by mouth one time as needed for Migraine for up to 1 dose. May repeat x 1 dose after 2 hours. 10 tablet 3   • loratadine (CLARITIN) 10 MG Tab Take 1 Tab by mouth every day. 30 Tab 0   • trazodone (DESYREL) 150 MG TABS Take 150 mg by mouth every evening.       No current facility-administered medications for this visit.       Patient Active Problem List    Diagnosis Date Noted   • Irregular periods 07/01/2021   • Other insomnia 04/01/2021   • Obesity (BMI 30-39.9) 04/01/2021   • Mild episode of recurrent major depressive disorder (HCC) 04/01/2021   • ABDON (generalized anxiety disorder) 04/01/2021   • Dyslipidemia 05/31/2019   • Migraine with aura and without status migrainosus, not intractable 03/28/2019   • ADHD 03/28/2019   • PTSD (post-traumatic stress disorder) 03/28/2019       Family History   Problem Relation Age of Onset   • Arthritis Mother    • Psychiatric Illness Mother    • Hyperlipidemia Mother    • Cancer Mother         skin   • Alcohol/Drug Father    • Psychiatric Illness Father    • No Known Problems Brother    • Hyperlipidemia Paternal Grandfather    •  "Hypertension Paternal Grandfather    • Psychiatric Illness Paternal Grandfather        She  has a past medical history of ADHD, Allergy, Anxiety, Asthma, Depression, Hyperlipidemia, Migraine, and PTSD (post-traumatic stress disorder). She also has no past medical history of ASTHMA or Diabetes.  She  has a past surgical history that includes other (2009).       Objective:   /62 Comment: Taken at home  Pulse 67   Ht 1.626 m (5' 4\")   Wt 102 kg (225 lb)   BMI 38.62 kg/m²   Vital signs reviewed     Physical Exam:  Constitutional: Alert, no distress, well-groomed.  Skin: No rashes in visible areas.  Eye: Round. Conjunctiva clear, lids normal. No icterus. Glasses in place.   ENMT: Lips pink without lesions, good dentition, moist mucous membranes. Phonation normal.  Neck: No masses, no thyromegaly. Moves freely without pain.  Respiratory: Unlabored respiratory effort, no cough or audible wheeze  Psych: Alert and oriented x3, normal affect and mood.       Assessment and Plan:   The following treatment plan was discussed:     1. Dyslipidemia  Chronic exacerbated problem.  Discussed and reviewed her recent lab results.  We discussed diet and lifestyle modifications.  She is interested in cardiac CT scoring.  Order placed today.  Contact information provided for CT cardiac scoring.  I also informed her that she may be charged 100 hours for the exam.  She verbalized understanding.  - CT-CARDIAC SCORING; Future    2. Other specified personal risk factors, not elsewhere classified  See #1 above  - CT-CARDIAC SCORING; Future    3. Migraine with aura and without status migrainosus, not intractable  Chronic exacerbated problem.  We discussed preventative maintenance medications versus abortive therapy.  As she gets once a month we discussed continue with abortive therapy.  We discussed starting Imitrex.  She does not smoke and her blood pressure is at goal.  We will follow-up in 3 months.  - SUMAtriptan (IMITREX) 25 MG Tab " tablet; Take 1 tablet by mouth one time as needed for Migraine for up to 1 dose. May repeat x 1 dose after 2 hours.  Dispense: 10 tablet; Refill: 3    4. Irregular periods  Acute uncomplicated problem.  Reassurance provided that this time I have heard of any Covid shots causing regular periods.  She would like to discuss IUD.  Referral placed to OB/GYN.  - REFERRAL TO OB/GYN    5. Mild episode of recurrent major depressive disorder (HCC)  Chronic stable problem.  She will continue with her fluoxetine.  She will continue follow-up with her psychiatrist.  She denies any self-harm or SI today.    6. Other insomnia  Chronic stable problem.  She continue with her trazodone.  She will continue follow-up with her psychiatrist.  No acute complaints today.        Follow-up: Return in about 3 months (around 10/1/2021) for migraines.     Educated in proper administration of medication(s) ordered today including safety, possible SE, risks, benefits, rationale and alternatives to therapy.     Please note that this dictation was created using voice recognition software. I have made every reasonable attempt to correct obvious errors, but I expect that there are errors of grammar and possibly content that I did not discover before finalizing the note.

## 2021-07-01 NOTE — ASSESSMENT & PLAN NOTE
Chronic medical problem She states she was previously prescribed a blood pressure medication in the past for her migraines but did not tolerate as it made her blood pressure low. She gets monthly migraines with her menstrual cycle. She takes OTC medications. She will get light sensitivity prior to her migraines. She tries to limit wheat as it can trigger. She would like to discuss starting Imitrex.

## 2022-12-03 ENCOUNTER — HOSPITAL ENCOUNTER (OUTPATIENT)
Dept: LAB | Facility: MEDICAL CENTER | Age: 32
End: 2022-12-03
Attending: PSYCHIATRY & NEUROLOGY
Payer: COMMERCIAL

## 2022-12-03 LAB
25(OH)D3 SERPL-MCNC: 41 NG/ML (ref 30–100)
ALBUMIN SERPL BCP-MCNC: 4.6 G/DL (ref 3.2–4.9)
ALBUMIN/GLOB SERPL: 1.4 G/DL
ALP SERPL-CCNC: 70 U/L (ref 30–99)
ALT SERPL-CCNC: 23 U/L (ref 2–50)
ANION GAP SERPL CALC-SCNC: 15 MMOL/L (ref 7–16)
AST SERPL-CCNC: 20 U/L (ref 12–45)
BASOPHILS # BLD AUTO: 0.6 % (ref 0–1.8)
BASOPHILS # BLD: 0.05 K/UL (ref 0–0.12)
BILIRUB SERPL-MCNC: 0.2 MG/DL (ref 0.1–1.5)
BUN SERPL-MCNC: 14 MG/DL (ref 8–22)
CALCIUM SERPL-MCNC: 10 MG/DL (ref 8.5–10.5)
CHLORIDE SERPL-SCNC: 106 MMOL/L (ref 96–112)
CO2 SERPL-SCNC: 23 MMOL/L (ref 20–33)
CREAT SERPL-MCNC: 0.59 MG/DL (ref 0.5–1.4)
EOSINOPHIL # BLD AUTO: 0.06 K/UL (ref 0–0.51)
EOSINOPHIL NFR BLD: 0.8 % (ref 0–6.9)
ERYTHROCYTE [DISTWIDTH] IN BLOOD BY AUTOMATED COUNT: 44.3 FL (ref 35.9–50)
GFR SERPLBLD CREATININE-BSD FMLA CKD-EPI: 122 ML/MIN/1.73 M 2
GLOBULIN SER CALC-MCNC: 3.2 G/DL (ref 1.9–3.5)
GLUCOSE SERPL-MCNC: 75 MG/DL (ref 65–99)
HCT VFR BLD AUTO: 46.5 % (ref 37–47)
HGB BLD-MCNC: 14.4 G/DL (ref 12–16)
IMM GRANULOCYTES # BLD AUTO: 0.03 K/UL (ref 0–0.11)
IMM GRANULOCYTES NFR BLD AUTO: 0.4 % (ref 0–0.9)
LYMPHOCYTES # BLD AUTO: 2.55 K/UL (ref 1–4.8)
LYMPHOCYTES NFR BLD: 32.4 % (ref 22–41)
MCH RBC QN AUTO: 27.5 PG (ref 27–33)
MCHC RBC AUTO-ENTMCNC: 31 G/DL (ref 33.6–35)
MCV RBC AUTO: 88.7 FL (ref 81.4–97.8)
MONOCYTES # BLD AUTO: 0.33 K/UL (ref 0–0.85)
MONOCYTES NFR BLD AUTO: 4.2 % (ref 0–13.4)
NEUTROPHILS # BLD AUTO: 4.85 K/UL (ref 2–7.15)
NEUTROPHILS NFR BLD: 61.6 % (ref 44–72)
NRBC # BLD AUTO: 0 K/UL
NRBC BLD-RTO: 0 /100 WBC
PLATELET # BLD AUTO: 321 K/UL (ref 164–446)
PMV BLD AUTO: 10.2 FL (ref 9–12.9)
POTASSIUM SERPL-SCNC: 4.3 MMOL/L (ref 3.6–5.5)
PROT SERPL-MCNC: 7.8 G/DL (ref 6–8.2)
RBC # BLD AUTO: 5.24 M/UL (ref 4.2–5.4)
SODIUM SERPL-SCNC: 144 MMOL/L (ref 135–145)
T4 FREE SERPL-MCNC: 1.27 NG/DL (ref 0.93–1.7)
WBC # BLD AUTO: 7.9 K/UL (ref 4.8–10.8)

## 2022-12-03 PROCEDURE — 82306 VITAMIN D 25 HYDROXY: CPT

## 2022-12-03 PROCEDURE — 80307 DRUG TEST PRSMV CHEM ANLYZR: CPT

## 2022-12-03 PROCEDURE — 84443 ASSAY THYROID STIM HORMONE: CPT

## 2022-12-03 PROCEDURE — 84439 ASSAY OF FREE THYROXINE: CPT

## 2022-12-03 PROCEDURE — 36415 COLL VENOUS BLD VENIPUNCTURE: CPT

## 2022-12-03 PROCEDURE — 85025 COMPLETE CBC W/AUTO DIFF WBC: CPT

## 2022-12-03 PROCEDURE — 80053 COMPREHEN METABOLIC PANEL: CPT

## 2022-12-05 LAB
AMPHET CTO UR CFM-MCNC: NEGATIVE NG/ML
BARBITURATES CTO UR CFM-MCNC: NEGATIVE NG/ML
BENZODIAZ CTO UR CFM-MCNC: NEGATIVE NG/ML
CANNABINOIDS CTO UR CFM-MCNC: NEGATIVE NG/ML
COCAINE CTO UR CFM-MCNC: NEGATIVE NG/ML
DRUG COMMENT 753798: NORMAL
METHADONE CTO UR CFM-MCNC: NEGATIVE NG/ML
OPIATES CTO UR CFM-MCNC: NEGATIVE NG/ML
PCP CTO UR CFM-MCNC: NEGATIVE NG/ML
PROPOXYPH CTO UR CFM-MCNC: NEGATIVE NG/ML

## 2022-12-06 LAB — TSH SERPL DL<=0.005 MIU/L-ACNC: 1.49 UIU/ML (ref 0.38–5.33)

## 2023-03-21 ENCOUNTER — OFFICE VISIT (OUTPATIENT)
Dept: MEDICAL GROUP | Facility: PHYSICIAN GROUP | Age: 33
End: 2023-03-21
Payer: COMMERCIAL

## 2023-03-21 ENCOUNTER — HOSPITAL ENCOUNTER (EMERGENCY)
Facility: MEDICAL CENTER | Age: 33
End: 2023-03-21
Attending: EMERGENCY MEDICINE
Payer: COMMERCIAL

## 2023-03-21 VITALS
BODY MASS INDEX: 40.29 KG/M2 | OXYGEN SATURATION: 97 % | TEMPERATURE: 97.5 F | SYSTOLIC BLOOD PRESSURE: 112 MMHG | HEIGHT: 64 IN | WEIGHT: 236 LBS | DIASTOLIC BLOOD PRESSURE: 68 MMHG | HEART RATE: 73 BPM

## 2023-03-21 VITALS
BODY MASS INDEX: 40.01 KG/M2 | HEART RATE: 70 BPM | SYSTOLIC BLOOD PRESSURE: 131 MMHG | WEIGHT: 234.35 LBS | DIASTOLIC BLOOD PRESSURE: 74 MMHG | HEIGHT: 64 IN | OXYGEN SATURATION: 99 % | TEMPERATURE: 98 F | RESPIRATION RATE: 16 BRPM

## 2023-03-21 DIAGNOSIS — R10.31 RIGHT LOWER QUADRANT ABDOMINAL PAIN: ICD-10-CM

## 2023-03-21 DIAGNOSIS — R10.10 UPPER ABDOMINAL PAIN: ICD-10-CM

## 2023-03-21 DIAGNOSIS — R68.81 EARLY SATIETY: ICD-10-CM

## 2023-03-21 DIAGNOSIS — K29.00 ACUTE GASTRITIS WITHOUT HEMORRHAGE, UNSPECIFIED GASTRITIS TYPE: ICD-10-CM

## 2023-03-21 DIAGNOSIS — R14.0 BLOATING: ICD-10-CM

## 2023-03-21 PROBLEM — R19.8 GI PROBLEM: Status: ACTIVE | Noted: 2023-03-21

## 2023-03-21 LAB
ALBUMIN SERPL BCP-MCNC: 4.5 G/DL (ref 3.2–4.9)
ALBUMIN/GLOB SERPL: 1.4 G/DL
ALP SERPL-CCNC: 65 U/L (ref 30–99)
ALT SERPL-CCNC: 20 U/L (ref 2–50)
ANION GAP SERPL CALC-SCNC: 12 MMOL/L (ref 7–16)
APPEARANCE UR: CLEAR
AST SERPL-CCNC: 14 U/L (ref 12–45)
BASOPHILS # BLD AUTO: 0.5 % (ref 0–1.8)
BASOPHILS # BLD: 0.04 K/UL (ref 0–0.12)
BILIRUB SERPL-MCNC: 0.2 MG/DL (ref 0.1–1.5)
BILIRUB UR STRIP-MCNC: NEGATIVE MG/DL
BUN SERPL-MCNC: 10 MG/DL (ref 8–22)
CALCIUM ALBUM COR SERPL-MCNC: 9.2 MG/DL (ref 8.5–10.5)
CALCIUM SERPL-MCNC: 9.6 MG/DL (ref 8.4–10.2)
CHLORIDE SERPL-SCNC: 103 MMOL/L (ref 96–112)
CO2 SERPL-SCNC: 24 MMOL/L (ref 20–33)
COLOR UR AUTO: YELLOW
CREAT SERPL-MCNC: 0.67 MG/DL (ref 0.5–1.4)
EOSINOPHIL # BLD AUTO: 0.04 K/UL (ref 0–0.51)
EOSINOPHIL NFR BLD: 0.5 % (ref 0–6.9)
ERYTHROCYTE [DISTWIDTH] IN BLOOD BY AUTOMATED COUNT: 39.2 FL (ref 35.9–50)
GFR SERPLBLD CREATININE-BSD FMLA CKD-EPI: 118 ML/MIN/1.73 M 2
GLOBULIN SER CALC-MCNC: 3.2 G/DL (ref 1.9–3.5)
GLUCOSE SERPL-MCNC: 94 MG/DL (ref 65–99)
GLUCOSE UR STRIP.AUTO-MCNC: NEGATIVE MG/DL
HCG SERPL QL: NEGATIVE
HCT VFR BLD AUTO: 46.9 % (ref 37–47)
HGB BLD-MCNC: 15.5 G/DL (ref 12–16)
IMM GRANULOCYTES # BLD AUTO: 0.01 K/UL (ref 0–0.11)
IMM GRANULOCYTES NFR BLD AUTO: 0.1 % (ref 0–0.9)
KETONES UR STRIP.AUTO-MCNC: NEGATIVE MG/DL
LEUKOCYTE ESTERASE UR QL STRIP.AUTO: NEGATIVE
LIPASE SERPL-CCNC: 18 U/L (ref 7–58)
LYMPHOCYTES # BLD AUTO: 1.69 K/UL (ref 1–4.8)
LYMPHOCYTES NFR BLD: 22 % (ref 22–41)
MCH RBC QN AUTO: 27.8 PG (ref 27–33)
MCHC RBC AUTO-ENTMCNC: 33 G/DL (ref 33.6–35)
MCV RBC AUTO: 84.1 FL (ref 81.4–97.8)
MONOCYTES # BLD AUTO: 0.44 K/UL (ref 0–0.85)
MONOCYTES NFR BLD AUTO: 5.7 % (ref 0–13.4)
NEUTROPHILS # BLD AUTO: 5.45 K/UL (ref 2–7.15)
NEUTROPHILS NFR BLD: 71.2 % (ref 44–72)
NITRITE UR QL STRIP.AUTO: NEGATIVE
NRBC # BLD AUTO: 0 K/UL
NRBC BLD-RTO: 0 /100 WBC
PH UR STRIP.AUTO: 5.5 [PH] (ref 5–8)
PLATELET # BLD AUTO: 341 K/UL (ref 164–446)
PMV BLD AUTO: 9.8 FL (ref 9–12.9)
POCT INT CON NEG: NEGATIVE
POCT INT CON POS: POSITIVE
POCT URINE PREGNANCY TEST: NEGATIVE
POTASSIUM SERPL-SCNC: 4.4 MMOL/L (ref 3.6–5.5)
PROT SERPL-MCNC: 7.7 G/DL (ref 6–8.2)
PROT UR QL STRIP: NEGATIVE MG/DL
RBC # BLD AUTO: 5.58 M/UL (ref 4.2–5.4)
RBC UR QL AUTO: NORMAL
SODIUM SERPL-SCNC: 139 MMOL/L (ref 135–145)
SP GR UR STRIP.AUTO: >=1.03
UROBILINOGEN UR STRIP-MCNC: 0.2 MG/DL
WBC # BLD AUTO: 7.7 K/UL (ref 4.8–10.8)

## 2023-03-21 PROCEDURE — 700102 HCHG RX REV CODE 250 W/ 637 OVERRIDE(OP): Performed by: EMERGENCY MEDICINE

## 2023-03-21 PROCEDURE — 36415 COLL VENOUS BLD VENIPUNCTURE: CPT

## 2023-03-21 PROCEDURE — 81025 URINE PREGNANCY TEST: CPT | Performed by: NURSE PRACTITIONER

## 2023-03-21 PROCEDURE — 83690 ASSAY OF LIPASE: CPT

## 2023-03-21 PROCEDURE — 700111 HCHG RX REV CODE 636 W/ 250 OVERRIDE (IP): Performed by: EMERGENCY MEDICINE

## 2023-03-21 PROCEDURE — 99215 OFFICE O/P EST HI 40 MIN: CPT | Performed by: NURSE PRACTITIONER

## 2023-03-21 PROCEDURE — 85025 COMPLETE CBC W/AUTO DIFF WBC: CPT

## 2023-03-21 PROCEDURE — A9270 NON-COVERED ITEM OR SERVICE: HCPCS | Performed by: EMERGENCY MEDICINE

## 2023-03-21 PROCEDURE — 84703 CHORIONIC GONADOTROPIN ASSAY: CPT

## 2023-03-21 PROCEDURE — 81002 URINALYSIS NONAUTO W/O SCOPE: CPT | Performed by: NURSE PRACTITIONER

## 2023-03-21 PROCEDURE — 80053 COMPREHEN METABOLIC PANEL: CPT

## 2023-03-21 PROCEDURE — 99284 EMERGENCY DEPT VISIT MOD MDM: CPT

## 2023-03-21 RX ORDER — OMEPRAZOLE 20 MG/1
20 CAPSULE, DELAYED RELEASE ORAL 2 TIMES DAILY
Qty: 60 CAPSULE | Refills: 0 | Status: SHIPPED | OUTPATIENT
Start: 2023-03-21 | End: 2023-04-20

## 2023-03-21 RX ORDER — ONDANSETRON 4 MG/1
4 TABLET, ORALLY DISINTEGRATING ORAL ONCE
Status: COMPLETED | OUTPATIENT
Start: 2023-03-21 | End: 2023-03-21

## 2023-03-21 RX ORDER — FLUOXETINE 10 MG/1
CAPSULE ORAL
COMMUNITY
Start: 2023-01-04

## 2023-03-21 RX ORDER — SUCRALFATE 1 G/1
1 TABLET ORAL
Qty: 120 TABLET | Refills: 3 | Status: SHIPPED | OUTPATIENT
Start: 2023-03-21

## 2023-03-21 RX ORDER — ONDANSETRON 4 MG/1
4 TABLET, ORALLY DISINTEGRATING ORAL EVERY 6 HOURS PRN
Qty: 10 TABLET | Refills: 0 | Status: SHIPPED | OUTPATIENT
Start: 2023-03-21

## 2023-03-21 RX ADMIN — LIDOCAINE HYDROCHLORIDE 30 ML: 20 SOLUTION OROPHARYNGEAL at 10:08

## 2023-03-21 RX ADMIN — ONDANSETRON 4 MG: 4 TABLET, ORALLY DISINTEGRATING ORAL at 10:08

## 2023-03-21 ASSESSMENT — FIBROSIS 4 INDEX
FIB4 SCORE: 0.42
FIB4 SCORE: 0.42

## 2023-03-21 NOTE — ED NOTES
Pt given d/c paperwork, RX p/u information and f/u instruction; pt verbalized understanding all information given. Pt ambulated out fo the ER w/o difficulty w/ spouse

## 2023-03-21 NOTE — ED TRIAGE NOTES
Chief Complaint   Patient presents with    Abdominal Pain     Mid upper with slight radiation to the left upper quadrant for 1 week, more nausea with eating. Pain seems to be worse at night. PCP sent her her for a possible appy. + vomiting X 2 in the last week.

## 2023-03-21 NOTE — DISCHARGE INSTRUCTIONS
I believe you have gastritis or possible peptic ulcer disease.  This may be secondary to something called H. pylori.  Typically the most sensitive testing is done in primary care physicians offices, through other stool tests or a balloon test.  We will start you on some Carafate and omeprazole which can help protect your stomach until test can be completed.

## 2023-03-21 NOTE — ASSESSMENT & PLAN NOTE
For the last week she has been having upper abdominal pain.  The pain is radiating to left upper side to back, chest and hips. The pain is described as stomach spasms, burning sensation. The pain is constant. Severity worse in the evening. Associated symptoms include vomiting x2, nausea, burping,insomnia, bloating, and early satiety. Aggravating factors include food and not eating. Alleviating factors include Yaro tea that decreases severity but does not completely alleviate. Interventions tried include Tums. Since Saturday she has been eating bland stews, broth, oatmeal. She is able to hold down liquids. Denies fever, chills, bloody stools, hematuria, dysuria, new medications, new foods, night sweats, unexplained weight loss. LMP 3/12/2023.

## 2023-03-21 NOTE — ED NOTES
Pt states sh emmanuel had UA done at PCP office prior to coming to ER for further; states she is unable to provide more urine at this time.

## 2023-03-21 NOTE — PROGRESS NOTES
Subjective:     CC: GI Problem    HPI:   Emperatriz presents today with her  for the following:    GI problem  For the last week she has been having upper abdominal pain.  The pain is radiating to left upper side to back, chest and hips. The pain is described as stomach spasms, burning sensation. The pain is constant. Severity worse in the evening. Associated symptoms include vomiting x2, nausea, burping,insomnia, bloating, and early satiety. Aggravating factors include food and not eating. Alleviating factors include Yaro tea that decreases severity but does not completely alleviate. Interventions tried include Tums. Since Saturday she has been eating bland stews, broth, oatmeal. She is able to hold down liquids. Denies fever, chills, bloody stools, hematuria, dysuria, new medications, new foods, night sweats, unexplained weight loss. LMP 3/12/2023.    Past Medical History:   Diagnosis Date    ADHD     Allergy     Anxiety     Asthma     Depression     Hyperlipidemia     Migraine     PTSD (post-traumatic stress disorder)     age 17       Social History     Tobacco Use    Smoking status: Former     Packs/day: 0.25     Years: 2.00     Pack years: 0.50     Types: Cigarettes     Quit date: 2009     Years since quittin.8    Smokeless tobacco: Never   Vaping Use    Vaping Use: Never used   Substance Use Topics    Alcohol use: No    Drug use: No       Current Outpatient Medications Ordered in Epic   Medication Sig Dispense Refill    FLUoxetine (PROZAC) 10 MG Cap TAKE 3 CAPSULES BY MOUTH ONCE DAILY      trazodone (DESYREL) 150 MG TABS Take 150 mg by mouth every evening.       No current Epic-ordered facility-administered medications on file.       Allergies:  Amoxicillin and Dilaudid [hydromorphone hcl]    Health Maintenance: Deferred      Objective:     Vital signs reviewed  Exam:  /68 (BP Location: Left arm, Patient Position: Sitting, BP Cuff Size: Adult)   Pulse 73   Temp 36.4 °C (97.5 °F)  "(Temporal)   Ht 1.626 m (5' 4\")   Wt 107 kg (236 lb)   SpO2 97%   BMI 40.51 kg/m²  Body mass index is 40.51 kg/m².      General: Normal appearing. No distress.   present in exam room.  Pulmonary: Clear to ausculation.  Normal effort. No rales, ronchi, or wheezing.  Cardiovascular: Regular rate and rhythm without murmur.   Abdomen: Soft, and nondistended.  Hypoactive bowel sounds. Liver and spleen are not palpable.  Pain on palpation to right lower quadrant without rebound tenderness.  Pain on palpation to left upper quadrant without rebound tenderness.  Patient is ill-appearing today.  No CVA tenderness.  Neurologic: Grossly nonfocal  Skin: Warm and dry.  No obvious lesions.  Musculoskeletal: Normal gait. No extremity cyanosis, clubbing, or edema.  Psych: Normal mood and affect. Alert and oriented x3. Judgment and insight is normal.    Labs:      03/21/23 08:56   POC Urine Pregnancy Test Negative      Latest Reference Range & Units 03/21/23 08:56   POC Color Negative  yellow   POC Appearance Negative  clear   POC Specific Gravity <1.005 - >1.030  >=1.030   POC Urine PH 5.0 - 8.0  5.5   POC Glucose Negative mg/dL negative   POC Ketones Negative mg/dL negative   POC Protein Negative mg/dL negative   POC Nitrites Negative  negative   POC Leukocyte Esterase Negative  negative   POC Blood Negative  trace-intact   POC Bilirubin Negative mg/dL negative   POC Urobiligen Negative (0.2) mg/dL 0.2       Assessment & Plan:     32 y.o. female with the following -     1. Right lower quadrant abdominal pain  2. Upper abdominal pain  Acute complicated problem.  Patient is ill-appearing and with her right lower quadrant pain and left lower quadrant pain today on exam I recommend that she be seen at a higher level of care today.  Differential diagnosis includes appendicitis, pancreatitis, cholecystitis, GERD.  Discussed with patient and her  that I would like her to go to the emergency room today for further " evaluation.  Both are in agreement.  I have called the transfer center and patient is being driven to the emergency room by her  today.  POCT pregnancy and urinalysis negative today in office.  - POCT Pregnancy  - POCT Urinalysis    3. Bloating  4. Early satiety  Acute uncomplicated problem.  I am sending her to the emergency room today given her abdominal pain that is not improving and worsening over the last week.  See #1 and # 2 above.        Return for To ER.    Please note that this dictation was created using voice recognition software. I have made every reasonable attempt to correct obvious errors, but I expect that there are errors of grammar and possibly content that I did not discover before finalizing the note.

## 2023-03-21 NOTE — ED PROVIDER NOTES
ED Provider Note    CHIEF COMPLAINT  Chief Complaint   Patient presents with    Abdominal Pain     Mid upper with slight radiation to the left upper quadrant for 1 week, more nausea with eating. Pain seems to be worse at night. PCP sent her her for a possible appy. + vomiting X 2 in the last week.         HPI/ROS    OUTSIDE HISTORIAN(S):  Patient's     Emperatriz Flores is a 32 y.o. female who presents with chief complaint of abdominal pain for approximately 1 week with associated vomiting.  Patient was seen by her primary care physician who checked a urinalysis and pregnancy test both of which were unremarkable and sent her here for further evaluation.  Patient reports that she has had epigastric and left upper quadrant pain now for the last week.  She reports pain is worse if she has not eaten for quite some time but that whenever she eats she gets nauseous.  She reports pain is worse at night and wakes her from sleep.  She reports it is a gnawing and burning in quality.  Patient denies any associated changes in her bowel movements other than some loose stool, she denies any black or bloody stool.  Patient reports associated vomiting, emesis is nonbloody nonbilious.  Patient denies any associated neck or back pain.  She denies any chest pain or shortness of breath.  Patient denies any heavy alcohol abuse.    PAST MEDICAL HISTORY   has a past medical history of ADHD, Allergy, Anxiety, Asthma, Depression, Hyperlipidemia, Migraine, and PTSD (post-traumatic stress disorder).    SURGICAL HISTORY   has a past surgical history that includes other (2009).    FAMILY HISTORY  Family History   Problem Relation Age of Onset    Arthritis Mother     Psychiatric Illness Mother     Hyperlipidemia Mother     Cancer Mother         skin    Alcohol/Drug Father     Psychiatric Illness Father     No Known Problems Brother     Hyperlipidemia Paternal Grandfather     Hypertension Paternal Grandfather     Psychiatric  "Illness Paternal Grandfather        SOCIAL HISTORY  Social History     Tobacco Use    Smoking status: Former     Packs/day: 0.25     Years: 2.00     Pack years: 0.50     Types: Cigarettes     Quit date: 2009     Years since quittin.8    Smokeless tobacco: Never   Vaping Use    Vaping Use: Never used   Substance and Sexual Activity    Alcohol use: No    Drug use: No    Sexual activity: Yes     Partners: Male     Birth control/protection: Condom     Comment:         CURRENT MEDICATIONS  Home Medications       Reviewed by Mary Hernández R.N. (Registered Nurse) on 23 at 0925  Med List Status: Not Addressed     Medication Last Dose Status   FLUoxetine (PROZAC) 10 MG Cap  Active   trazodone (DESYREL) 150 MG TABS  Active                    ALLERGIES  Allergies   Allergen Reactions    Amoxicillin     Dilaudid [Hydromorphone Hcl] Itching       PHYSICAL EXAM  VITAL SIGNS: BP (!) 135/93   Pulse 70   Temp 36.7 °C (98 °F) (Temporal)   Resp 16   Ht 1.626 m (5' 4\")   Wt 106 kg (234 lb 5.6 oz)   LMP 2023 (Exact Date)   SpO2 99%   BMI 40.23 kg/m²    Physical Exam  Constitutional:       Appearance: She is well-developed.   HENT:      Head: Normocephalic and atraumatic.   Eyes:      Pupils: Pupils are equal, round, and reactive to light.   Cardiovascular:      Rate and Rhythm: Normal rate and regular rhythm.   Pulmonary:      Effort: Pulmonary effort is normal. No accessory muscle usage or respiratory distress.      Breath sounds: Normal breath sounds.   Abdominal:      General: Bowel sounds are normal.      Palpations: Abdomen is soft. There is no mass.      Tenderness: There is abdominal tenderness in the epigastric area and left upper quadrant. Negative signs include Torres's sign.      Comments: Epigastric tenderness to palpation, without rebound or guarding.     Musculoskeletal:         General: Normal range of motion.   Skin:     General: Skin is warm.      Capillary Refill: Capillary refill " takes less than 2 seconds.   Neurological:      General: No focal deficit present.      Mental Status: She is alert.   Psychiatric:         Mood and Affect: Mood normal. Mood is not anxious.   ;    DIAGNOSTIC STUDIES / PROCEDURES      LABS  Results for orders placed or performed during the hospital encounter of 03/21/23   CBC WITH DIFFERENTIAL   Result Value Ref Range    WBC 7.7 4.8 - 10.8 K/uL    RBC 5.58 (H) 4.20 - 5.40 M/uL    Hemoglobin 15.5 12.0 - 16.0 g/dL    Hematocrit 46.9 37.0 - 47.0 %    MCV 84.1 81.4 - 97.8 fL    MCH 27.8 27.0 - 33.0 pg    MCHC 33.0 (L) 33.6 - 35.0 g/dL    RDW 39.2 35.9 - 50.0 fL    Platelet Count 341 164 - 446 K/uL    MPV 9.8 9.0 - 12.9 fL    Neutrophils-Polys 71.20 44.00 - 72.00 %    Lymphocytes 22.00 22.00 - 41.00 %    Monocytes 5.70 0.00 - 13.40 %    Eosinophils 0.50 0.00 - 6.90 %    Basophils 0.50 0.00 - 1.80 %    Immature Granulocytes 0.10 0.00 - 0.90 %    Nucleated RBC 0.00 /100 WBC    Neutrophils (Absolute) 5.45 2.00 - 7.15 K/uL    Lymphs (Absolute) 1.69 1.00 - 4.80 K/uL    Monos (Absolute) 0.44 0.00 - 0.85 K/uL    Eos (Absolute) 0.04 0.00 - 0.51 K/uL    Baso (Absolute) 0.04 0.00 - 0.12 K/uL    Immature Granulocytes (abs) 0.01 0.00 - 0.11 K/uL    NRBC (Absolute) 0.00 K/uL   COMP METABOLIC PANEL   Result Value Ref Range    Sodium 139 135 - 145 mmol/L    Potassium 4.4 3.6 - 5.5 mmol/L    Chloride 103 96 - 112 mmol/L    Co2 24 20 - 33 mmol/L    Anion Gap 12.0 7.0 - 16.0    Glucose 94 65 - 99 mg/dL    Bun 10 8 - 22 mg/dL    Creatinine 0.67 0.50 - 1.40 mg/dL    Calcium 9.6 8.4 - 10.2 mg/dL    AST(SGOT) 14 12 - 45 U/L    ALT(SGPT) 20 2 - 50 U/L    Alkaline Phosphatase 65 30 - 99 U/L    Total Bilirubin 0.2 0.1 - 1.5 mg/dL    Albumin 4.5 3.2 - 4.9 g/dL    Total Protein 7.7 6.0 - 8.2 g/dL    Globulin 3.2 1.9 - 3.5 g/dL    A-G Ratio 1.4 g/dL   LIPASE   Result Value Ref Range    Lipase 18 7 - 58 U/L   HCG QUAL SERUM   Result Value Ref Range    Beta-Hcg Qualitative Serum Negative Negative    CORRECTED CALCIUM   Result Value Ref Range    Correct Calcium 9.2 8.5 - 10.5 mg/dL   ESTIMATED GFR   Result Value Ref Range    GFR (CKD-EPI) 118 >60 mL/min/1.73 m 2           COURSE & MEDICAL DECISION MAKING      INITIAL ASSESSMENT, COURSE AND PLAN  Care Narrative: pt here with symptoms that appear most consistent with likely gastritis versus peptic ulcer disease.  Patient appears very well.  Shoulder has epigastric tenderness, she has a negative Torres's.  My suspicion of cholestatic process is very low.  Suspicion of a surgical process is very low as well.  We will send basic labs for further risk stratification including a lipase to evaluate for possible pancreatitis.  Patient will be given GI cocktail and Zofran.  Following Zofran and GI cocktail patient reports she is feeling considerably improved.  Patient without any associated chest pain, shortness of breath or exertional symptoms to suggest cardiopulmonary etiology.  Patient basic labs are all very reassuring.  Patient p.o. challenged without issue.  Patient will be discharged home with Zofran, Carafate and omeprazole.  I discussed return precautions with patient.     DISPOSITION AND DISCUSSIONS    Escalation of care considered, and ultimately not performed: CT and ultrasound were deferred given my very low clinical suspicion of surgical pathology, I discussed this reasoning with patient and her , both are in agreement with deferral in lieu of strict return precautions        FINAL DIAGNOSIS  1. Acute gastritis without hemorrhage, unspecified gastritis type

## 2024-06-18 ENCOUNTER — RESEARCH ENCOUNTER (OUTPATIENT)
Dept: RESEARCH | Facility: MEDICAL CENTER | Age: 34
End: 2024-06-18

## 2025-06-22 ENCOUNTER — OFFICE VISIT (OUTPATIENT)
Dept: URGENT CARE | Facility: PHYSICIAN GROUP | Age: 35
End: 2025-06-22

## 2025-06-22 VITALS
HEART RATE: 71 BPM | SYSTOLIC BLOOD PRESSURE: 110 MMHG | DIASTOLIC BLOOD PRESSURE: 60 MMHG | WEIGHT: 188 LBS | RESPIRATION RATE: 18 BRPM | HEIGHT: 64 IN | TEMPERATURE: 97 F | OXYGEN SATURATION: 97 % | BODY MASS INDEX: 32.1 KG/M2

## 2025-06-22 DIAGNOSIS — R39.9 UTI SYMPTOMS: Primary | ICD-10-CM

## 2025-06-22 DIAGNOSIS — R30.0 DYSURIA: ICD-10-CM

## 2025-06-22 LAB
APPEARANCE UR: CLEAR
BILIRUB UR STRIP-MCNC: NORMAL MG/DL
COLOR UR AUTO: YELLOW
GLUCOSE UR STRIP.AUTO-MCNC: NORMAL MG/DL
KETONES UR STRIP.AUTO-MCNC: NORMAL MG/DL
LEUKOCYTE ESTERASE UR QL STRIP.AUTO: NORMAL
NITRITE UR QL STRIP.AUTO: NORMAL
PH UR STRIP.AUTO: 5.5 [PH] (ref 5–8)
POCT INT CON NEG: NEGATIVE
POCT INT CON POS: POSITIVE
POCT URINE PREGNANCY TEST: NEGATIVE
PROT UR QL STRIP: NORMAL MG/DL
RBC UR QL AUTO: NORMAL
SP GR UR STRIP.AUTO: 1.02
UROBILINOGEN UR STRIP-MCNC: 0.2 MG/DL

## 2025-06-22 PROCEDURE — 81002 URINALYSIS NONAUTO W/O SCOPE: CPT

## 2025-06-22 PROCEDURE — 3074F SYST BP LT 130 MM HG: CPT

## 2025-06-22 PROCEDURE — 99214 OFFICE O/P EST MOD 30 MIN: CPT

## 2025-06-22 PROCEDURE — 3078F DIAST BP <80 MM HG: CPT

## 2025-06-22 PROCEDURE — 81025 URINE PREGNANCY TEST: CPT

## 2025-06-22 RX ORDER — TRAZODONE HYDROCHLORIDE 50 MG/1
50 TABLET ORAL NIGHTLY
COMMUNITY

## 2025-06-22 RX ORDER — ALPRAZOLAM 1 MG/1
TABLET ORAL
COMMUNITY

## 2025-06-22 ASSESSMENT — ENCOUNTER SYMPTOMS
BRUISES/BLEEDS EASILY: 0
DIARRHEA: 0
EYE REDNESS: 0
FEVER: 0
CONSTIPATION: 0
WHEEZING: 0
EYE DISCHARGE: 0
COUGH: 0
VOMITING: 0
BLOOD IN STOOL: 0

## 2025-06-22 NOTE — PROGRESS NOTES
Subjective:     Emperatriz Flores is a 35 y.o. female who presents for Dysuria (C/o burning with urination, along with low back pain x 1 month. States it happens on and off. Mentions she gets tingling on her feet when the pain gets really bad. Has taken tylenol for the pain.)      Dysuria   Pertinent negatives include no frequency, hematuria or vomiting.       Review of Systems   Constitutional:  Negative for fever.   HENT:  Negative for congestion and ear discharge.    Eyes:  Negative for discharge and redness.   Respiratory:  Negative for cough and wheezing.    Gastrointestinal:  Negative for blood in stool, constipation, diarrhea and vomiting.   Genitourinary:  Positive for dysuria. Negative for frequency and hematuria.   Skin:  Negative for itching and rash.   Endo/Heme/Allergies:  Does not bruise/bleed easily.        CURRENT MEDICATIONS:  ALPRAZolam Tabs  FLUoxetine Caps  ondansetron Tbdp  sucralfate Tabs  traZODone Tabs    Allergies:   Allergies[1]    Current Problems: Emperatriz Flores does not have any pertinent problems on file.  Past Surgical Hx:    Past Surgical History:   Procedure Laterality Date    OTHER  2009    wisdom teeth      Past Social Hx:  reports that she quit smoking about 16 years ago. Her smoking use included cigarettes. She started smoking about 18 years ago. She has a 0.5 pack-year smoking history. She has never used smokeless tobacco. She reports that she does not drink alcohol and does not use drugs.   Past Family Hx:  Emperatriz Flores family history includes Alcohol/Drug in her father; Arthritis in her mother; Cancer in her mother; Hyperlipidemia in her mother and paternal grandfather; Hypertension in her paternal grandfather; No Known Problems in her brother; Psychiatric Illness in her father, mother, and paternal grandfather.     (Allergies, Medications, & Tobacco/Substance Use were reconciled by the Medical Assistant and reviewed by myself. The family  "history is prepopulated)       Objective:     /60 (BP Location: Left arm, Patient Position: Sitting, BP Cuff Size: Adult long)   Pulse 71   Temp 36.1 °C (97 °F) (Temporal)   Resp 18   Ht 1.626 m (5' 4\")   Wt 85.3 kg (188 lb)   SpO2 97%   BMI 32.27 kg/m²     Physical Exam  Constitutional:       General: She is not in acute distress.     Appearance: Normal appearance. She is not ill-appearing, toxic-appearing or diaphoretic.   HENT:      Head: Normocephalic and atraumatic.      Nose: Nose normal.   Eyes:      General: No scleral icterus.        Right eye: No discharge.         Left eye: No discharge.   Cardiovascular:      Rate and Rhythm: Normal rate and regular rhythm.   Pulmonary:      Effort: Pulmonary effort is normal. No respiratory distress.   Abdominal:      General: Abdomen is flat.      Palpations: Abdomen is soft.   Musculoskeletal:         General: Normal range of motion.      Cervical back: No rigidity.   Skin:     General: Skin is warm and dry.   Neurological:      General: No focal deficit present.      Mental Status: She is alert and oriented to person, place, and time. Mental status is at baseline.   Psychiatric:         Behavior: Behavior normal.         Judgment: Judgment normal.         Assessment/Plan:   Emperatriz was seen today for dysuria.    Diagnoses and all orders for this visit:    UTI symptoms  -     POCT Urinalysis  -     POCT Pregnancy  -     Referral to Urology  -     Referral to establish with PCP    Dysuria  -     Referral to Urology  -     Referral to establish with PCP    Based on history presenting illness, review of systems and physical exam findings, most likely cause of patient's main concerns today remains unknown and require further workup as ordered above for definitive management. Declines vaginosis /STD panel testing.  Patient understands that is their responsibility  to follow-up with primary care provider and/or any referrals placed today for the further workup " and management of complaints today.  Patient understands that although I have initiated workup for complaints that I am unable to resolve  them in the urgent care setting, it is his responsibility to follow-up with above orders, including referrals laboratory testing and initiation of testing.  Patient also understands that if in the interim of workup orders placed as above, condition worsens patient is to present to the emergency department for emergent evaluation.  The risks the benefits and indications of all new medications even once prescribed and given in clinic today were discussed and the indications for all referrals placed were also discussed.     Differential diagnosis, natural history, supportive care, and indications for immediate follow-up discussed.    Advised the patient to follow-up with the primary care physician for recheck, reevaluation, and consideration of further management.    Please note that this dictation was created using voice recognition software. I have made reasonable attempt to correct obvious errors, but I expect that there are errors of grammar and possibly content that I did not discover before finalizing the note.    This note was electronically signed by Cecelia Alfaro MD PhD         [1]   Allergies  Allergen Reactions    Dilaudid [Hydromorphone Hcl] Itching

## 2025-06-27 NOTE — Clinical Note
REFERRAL APPROVAL NOTICE         Sent on June 27, 2025                   Emperatriz Sorensen Lucy Flores  [address] [facility] [address]                   Dear Ms. Flores,    After a careful review of the medical information and benefit coverage, [facility] has processed your referral. See below for additional details.    If applicable, you must be actively enrolled with your insurance for coverage of the authorized service. If you have any questions regarding your coverage, please contact your insurance directly.    REFERRAL INFORMATION   Referral #:  [id number]  Referred-To Department    Referred-By Provider:  Urology    Cecelia Alfaro MD, PhD   [facility]      [address]  [address]  [facility] [id number] [phone] [address] [facility] [address]  [facility] [address] [phone]    Referral Start Date:  06/22/2025  Referral End Date:   06/22/2026             SCHEDULING  If you do not already have an appointment, please call [phone] to make an appointment.     MORE INFORMATION  If you do not already have a [email or web address] account, sign up at: [email or web address]  You can access your medical information, make appointments, see lab results, billing information, and more.  If you have questions regarding this referral, please contact  the [facility] Referrals department at:             [phone]. Monday - Friday 8:00AM - 5:00PM.     Sincerely,    [facility]